# Patient Record
Sex: FEMALE | Race: WHITE | Employment: OTHER | URBAN - NONMETROPOLITAN AREA
[De-identification: names, ages, dates, MRNs, and addresses within clinical notes are randomized per-mention and may not be internally consistent; named-entity substitution may affect disease eponyms.]

---

## 2017-06-22 ENCOUNTER — OFFICE VISIT (OUTPATIENT)
Age: 62
End: 2017-06-22

## 2017-06-22 ENCOUNTER — TELEPHONE (OUTPATIENT)
Age: 62
End: 2017-06-22

## 2017-06-22 VITALS
SYSTOLIC BLOOD PRESSURE: 110 MMHG | RESPIRATION RATE: 18 BRPM | OXYGEN SATURATION: 96 % | BODY MASS INDEX: 25.26 KG/M2 | HEART RATE: 77 BPM | HEIGHT: 65 IN | WEIGHT: 151.6 LBS | DIASTOLIC BLOOD PRESSURE: 62 MMHG | TEMPERATURE: 96.6 F

## 2017-06-22 DIAGNOSIS — Z12.11 ENCOUNTER FOR SCREENING COLONOSCOPY: Primary | ICD-10-CM

## 2017-06-22 PROCEDURE — 99999 PR OFFICE/OUTPT VISIT,PROCEDURE ONLY: CPT | Performed by: SURGERY

## 2017-06-22 ASSESSMENT — ENCOUNTER SYMPTOMS
BACK PAIN: 0
TROUBLE SWALLOWING: 0
ABDOMINAL PAIN: 0
EYE PAIN: 0
CONSTIPATION: 0
EYE REDNESS: 0
VOMITING: 0
VOICE CHANGE: 0
NAUSEA: 0
FACIAL SWELLING: 0
RHINORRHEA: 0
COUGH: 0
ANAL BLEEDING: 0
APNEA: 0
ABDOMINAL DISTENTION: 0
SORE THROAT: 0
CHEST TIGHTNESS: 0
SHORTNESS OF BREATH: 0
PHOTOPHOBIA: 0
STRIDOR: 0
DIARRHEA: 0
EYE ITCHING: 0
RECTAL PAIN: 0
SINUS PRESSURE: 0
BLOOD IN STOOL: 0
CHOKING: 0
EYE DISCHARGE: 0
WHEEZING: 0
COLOR CHANGE: 0

## 2017-07-18 ENCOUNTER — ANESTHESIA EVENT (OUTPATIENT)
Dept: ENDOSCOPY | Age: 62
End: 2017-07-18
Payer: COMMERCIAL

## 2017-07-18 ENCOUNTER — ANESTHESIA (OUTPATIENT)
Dept: ENDOSCOPY | Age: 62
End: 2017-07-18
Payer: COMMERCIAL

## 2017-07-18 ENCOUNTER — HOSPITAL ENCOUNTER (OUTPATIENT)
Age: 62
Setting detail: OUTPATIENT SURGERY
Discharge: HOME | End: 2017-07-18
Attending: SURGERY | Admitting: SURGERY
Payer: COMMERCIAL

## 2017-07-18 VITALS
HEART RATE: 68 BPM | OXYGEN SATURATION: 99 % | HEIGHT: 64 IN | TEMPERATURE: 97.2 F | DIASTOLIC BLOOD PRESSURE: 67 MMHG | RESPIRATION RATE: 18 BRPM | BODY MASS INDEX: 26.12 KG/M2 | WEIGHT: 153 LBS | SYSTOLIC BLOOD PRESSURE: 104 MMHG

## 2017-07-18 VITALS — DIASTOLIC BLOOD PRESSURE: 72 MMHG | OXYGEN SATURATION: 98 % | SYSTOLIC BLOOD PRESSURE: 108 MMHG

## 2017-07-18 PROCEDURE — 7100000000 HC PACU RECOVERY - FIRST 15 MIN: Performed by: SURGERY

## 2017-07-18 PROCEDURE — 3609027000 HC COLONOSCOPY: Performed by: SURGERY

## 2017-07-18 PROCEDURE — 2500000003 HC RX 250 WO HCPCS

## 2017-07-18 PROCEDURE — 2580000003 HC RX 258: Performed by: SURGERY

## 2017-07-18 PROCEDURE — 3700000001 HC ADD 15 MINUTES (ANESTHESIA): Performed by: SURGERY

## 2017-07-18 PROCEDURE — 6360000002 HC RX W HCPCS: Performed by: NURSE ANESTHETIST, CERTIFIED REGISTERED

## 2017-07-18 PROCEDURE — 3700000000 HC ANESTHESIA ATTENDED CARE: Performed by: SURGERY

## 2017-07-18 PROCEDURE — 6360000002 HC RX W HCPCS

## 2017-07-18 PROCEDURE — 7100000001 HC PACU RECOVERY - ADDTL 15 MIN: Performed by: SURGERY

## 2017-07-18 RX ORDER — SODIUM CHLORIDE 450 MG/100ML
INJECTION, SOLUTION INTRAVENOUS CONTINUOUS
Status: DISCONTINUED | OUTPATIENT
Start: 2017-07-18 | End: 2017-07-18 | Stop reason: HOSPADM

## 2017-07-18 RX ORDER — PROPOFOL 10 MG/ML
INJECTION, EMULSION INTRAVENOUS PRN
Status: DISCONTINUED | OUTPATIENT
Start: 2017-07-18 | End: 2017-07-18 | Stop reason: SDUPTHER

## 2017-07-18 RX ADMIN — PROPOFOL 50 MG: 10 INJECTION, EMULSION INTRAVENOUS at 08:30

## 2017-07-18 RX ADMIN — PROPOFOL 50 MG: 10 INJECTION, EMULSION INTRAVENOUS at 08:25

## 2017-07-18 RX ADMIN — PROPOFOL 50 MG: 10 INJECTION, EMULSION INTRAVENOUS at 08:14

## 2017-07-18 RX ADMIN — PROPOFOL 50 MG: 10 INJECTION, EMULSION INTRAVENOUS at 08:15

## 2017-07-18 RX ADMIN — PROPOFOL 50 MG: 10 INJECTION, EMULSION INTRAVENOUS at 08:20

## 2017-07-18 RX ADMIN — PROPOFOL 50 MG: 10 INJECTION, EMULSION INTRAVENOUS at 08:35

## 2017-07-18 RX ADMIN — SODIUM CHLORIDE: 4.5 INJECTION, SOLUTION INTRAVENOUS at 07:41

## 2017-07-18 ASSESSMENT — PAIN - FUNCTIONAL ASSESSMENT: PAIN_FUNCTIONAL_ASSESSMENT: 0-10

## 2017-07-18 ASSESSMENT — PAIN SCALES - GENERAL
PAINLEVEL_OUTOF10: 0
PAINLEVEL_OUTOF10: 0

## 2017-07-20 ENCOUNTER — OFFICE VISIT (OUTPATIENT)
Dept: FAMILY MEDICINE CLINIC | Age: 62
End: 2017-07-20
Payer: COMMERCIAL

## 2017-07-20 VITALS
DIASTOLIC BLOOD PRESSURE: 70 MMHG | BODY MASS INDEX: 25.49 KG/M2 | HEART RATE: 64 BPM | SYSTOLIC BLOOD PRESSURE: 118 MMHG | WEIGHT: 153 LBS | HEIGHT: 65 IN

## 2017-07-20 DIAGNOSIS — E66.3 OVERWEIGHT: ICD-10-CM

## 2017-07-20 DIAGNOSIS — E03.8 OTHER SPECIFIED HYPOTHYROIDISM: ICD-10-CM

## 2017-07-20 DIAGNOSIS — L23.7 CONTACT DERMATITIS DUE TO POISON IVY: Primary | ICD-10-CM

## 2017-07-20 PROCEDURE — 99213 OFFICE O/P EST LOW 20 MIN: CPT | Performed by: FAMILY MEDICINE

## 2017-07-20 RX ORDER — PREDNISONE 20 MG/1
40 TABLET ORAL DAILY
Qty: 28 TABLET | Refills: 0 | Status: SHIPPED | OUTPATIENT
Start: 2017-07-20 | End: 2017-08-03

## 2017-07-20 RX ORDER — TRIAMCINOLONE ACETONIDE 1 MG/G
CREAM TOPICAL
Qty: 1 TUBE | Refills: 0 | Status: SHIPPED | OUTPATIENT
Start: 2017-07-20 | End: 2017-07-27 | Stop reason: ALTCHOICE

## 2017-07-21 ENCOUNTER — HOSPITAL ENCOUNTER (OUTPATIENT)
Age: 62
Setting detail: SPECIMEN
Discharge: HOME | End: 2017-07-21
Payer: COMMERCIAL

## 2017-07-21 DIAGNOSIS — E03.8 OTHER SPECIFIED HYPOTHYROIDISM: ICD-10-CM

## 2017-07-21 DIAGNOSIS — E66.3 OVERWEIGHT: ICD-10-CM

## 2017-07-21 LAB
GLUCOSE FASTING: 101 MG/DL (ref 70–108)
T4 FREE: 1.64 NG/DL (ref 0.93–1.76)
TSH SERPL DL<=0.05 MIU/L-ACNC: 0.29 UIU/ML (ref 0.4–4.2)

## 2017-07-21 PROCEDURE — 82947 ASSAY GLUCOSE BLOOD QUANT: CPT

## 2017-07-21 PROCEDURE — 84439 ASSAY OF FREE THYROXINE: CPT

## 2017-07-21 PROCEDURE — 84443 ASSAY THYROID STIM HORMONE: CPT

## 2017-07-21 PROCEDURE — 36415 COLL VENOUS BLD VENIPUNCTURE: CPT

## 2017-07-23 PROBLEM — R73.01 ELEVATED FASTING GLUCOSE: Status: ACTIVE | Noted: 2017-07-23

## 2017-07-24 ENCOUNTER — TELEPHONE (OUTPATIENT)
Dept: FAMILY MEDICINE CLINIC | Age: 62
End: 2017-07-24

## 2017-07-27 ENCOUNTER — OFFICE VISIT (OUTPATIENT)
Dept: FAMILY MEDICINE CLINIC | Age: 62
End: 2017-07-27
Payer: COMMERCIAL

## 2017-07-27 VITALS
BODY MASS INDEX: 25.52 KG/M2 | WEIGHT: 153.2 LBS | HEIGHT: 65 IN | SYSTOLIC BLOOD PRESSURE: 128 MMHG | HEART RATE: 60 BPM | DIASTOLIC BLOOD PRESSURE: 80 MMHG

## 2017-07-27 DIAGNOSIS — R73.01 ELEVATED FASTING GLUCOSE: ICD-10-CM

## 2017-07-27 DIAGNOSIS — E03.8 OTHER SPECIFIED HYPOTHYROIDISM: Primary | ICD-10-CM

## 2017-07-27 PROCEDURE — 99213 OFFICE O/P EST LOW 20 MIN: CPT | Performed by: FAMILY MEDICINE

## 2017-07-27 RX ORDER — LEVOTHYROXINE SODIUM 0.15 MG/1
150 TABLET ORAL DAILY
Qty: 90 TABLET | Refills: 3 | Status: SHIPPED | OUTPATIENT
Start: 2017-07-27 | End: 2018-01-24

## 2017-08-23 DIAGNOSIS — Z12.11 ENCOUNTER FOR SCREENING COLONOSCOPY: ICD-10-CM

## 2018-01-24 ENCOUNTER — OFFICE VISIT (OUTPATIENT)
Dept: FAMILY MEDICINE CLINIC | Age: 63
End: 2018-01-24
Payer: COMMERCIAL

## 2018-01-24 ENCOUNTER — TELEPHONE (OUTPATIENT)
Dept: FAMILY MEDICINE CLINIC | Age: 63
End: 2018-01-24

## 2018-01-24 VITALS
WEIGHT: 150.4 LBS | HEART RATE: 76 BPM | BODY MASS INDEX: 25.06 KG/M2 | HEIGHT: 65 IN | DIASTOLIC BLOOD PRESSURE: 70 MMHG | SYSTOLIC BLOOD PRESSURE: 104 MMHG

## 2018-01-24 DIAGNOSIS — E03.8 OTHER SPECIFIED HYPOTHYROIDISM: ICD-10-CM

## 2018-01-24 DIAGNOSIS — M25.531 RIGHT WRIST PAIN: Primary | ICD-10-CM

## 2018-01-24 LAB
T4 FREE: 2.01 NG/DL (ref 0.93–1.76)
TSH SERPL DL<=0.05 MIU/L-ACNC: 0.05 UIU/ML (ref 0.4–4.2)

## 2018-01-24 PROCEDURE — 36415 COLL VENOUS BLD VENIPUNCTURE: CPT | Performed by: FAMILY MEDICINE

## 2018-01-24 PROCEDURE — 99213 OFFICE O/P EST LOW 20 MIN: CPT | Performed by: FAMILY MEDICINE

## 2018-01-24 RX ORDER — NAPROXEN 500 MG/1
500 TABLET ORAL 2 TIMES DAILY WITH MEALS
Qty: 30 TABLET | Refills: 0 | Status: SHIPPED | OUTPATIENT
Start: 2018-01-24 | End: 2018-02-21 | Stop reason: SDUPTHER

## 2018-01-24 RX ORDER — LEVOTHYROXINE SODIUM 125 MCG
125 TABLET ORAL DAILY
Qty: 30 TABLET | Refills: 0 | Status: SHIPPED | OUTPATIENT
Start: 2018-01-24 | End: 2018-02-20

## 2018-01-24 ASSESSMENT — PATIENT HEALTH QUESTIONNAIRE - PHQ9
2. FEELING DOWN, DEPRESSED OR HOPELESS: 0
SUM OF ALL RESPONSES TO PHQ9 QUESTIONS 1 & 2: 0
SUM OF ALL RESPONSES TO PHQ9 QUESTIONS 1 & 2: 0
SUM OF ALL RESPONSES TO PHQ QUESTIONS 1-9: 0
1. LITTLE INTEREST OR PLEASURE IN DOING THINGS: 0
SUM OF ALL RESPONSES TO PHQ QUESTIONS 1-9: 0
2. FEELING DOWN, DEPRESSED OR HOPELESS: 0

## 2018-01-24 NOTE — PATIENT INSTRUCTIONS
Patient Education        Carpal Tunnel Syndrome: Exercises  Your Care Instructions  Here are some examples of typical rehabilitation exercises for your condition. Start each exercise slowly. Ease off the exercise if you start to have pain. Your doctor or your physical or occupational therapist will tell you when you can start these exercises and which ones will work best for you. Warm-up stretches  When you no longer have pain or numbness, you can do exercises to help prevent carpal tunnel syndrome from coming back. Do not do any stretch or movement that is uncomfortable or painful. 1. Rotate your wrist up, down, and from side to side. Repeat 4 times. 2. Stretch your fingers far apart. Relax them, and then stretch them again. Repeat 4 times. 3. Stretch your thumb by pulling it back gently, holding it, and then releasing it. Repeat 4 times. How to do the exercises  Prayer stretch    1. Start with your palms together in front of your chest just below your chin. 2. Slowly lower your hands toward your waistline, keeping your hands close to your stomach and your palms together until you feel a mild to moderate stretch under your forearms. 3. Hold for at least 15 to 30 seconds. Repeat 2 to 4 times. Wrist flexor stretch    1. Extend your arm in front of you with your palm up. 2. Bend your wrist, pointing your hand toward the floor. 3. With your other hand, gently bend your wrist farther until you feel a mild to moderate stretch in your forearm. 4. Hold for at least 15 to 30 seconds. Repeat 2 to 4 times. Wrist extensor stretch    1. Repeat steps 1 through 4 of the stretch above, but begin with your extended hand palm down. Follow-up care is a key part of your treatment and safety. Be sure to make and go to all appointments, and call your doctor if you are having problems. It's also a good idea to know your test results and keep a list of the medicines you take. Where can you learn more?   Go to

## 2018-01-24 NOTE — TELEPHONE ENCOUNTER
Low tsh with high t4. She is overtreated on synthroid. Decrease synthroid to 125 mg which was sent to pharmacy. Recheck tsh in 1 month. Please advise patient.   Mikaela Shaikh MD

## 2018-02-19 ENCOUNTER — NURSE ONLY (OUTPATIENT)
Dept: FAMILY MEDICINE CLINIC | Age: 63
End: 2018-02-19
Payer: COMMERCIAL

## 2018-02-19 DIAGNOSIS — E03.8 OTHER SPECIFIED HYPOTHYROIDISM: ICD-10-CM

## 2018-02-19 LAB
T4 FREE: 1.78 NG/DL (ref 0.93–1.76)
TSH SERPL DL<=0.05 MIU/L-ACNC: 0.12 UIU/ML (ref 0.4–4.2)

## 2018-02-19 PROCEDURE — 36415 COLL VENOUS BLD VENIPUNCTURE: CPT | Performed by: FAMILY MEDICINE

## 2018-02-20 ENCOUNTER — TELEPHONE (OUTPATIENT)
Dept: FAMILY MEDICINE CLINIC | Age: 63
End: 2018-02-20

## 2018-02-20 DIAGNOSIS — E03.8 OTHER SPECIFIED HYPOTHYROIDISM: ICD-10-CM

## 2018-02-20 RX ORDER — LEVOTHYROXINE SODIUM 100 MCG
100 TABLET ORAL DAILY
Qty: 30 TABLET | Refills: 0 | Status: SHIPPED | OUTPATIENT
Start: 2018-02-20 | End: 2018-03-21 | Stop reason: SDUPTHER

## 2018-02-21 ENCOUNTER — OFFICE VISIT (OUTPATIENT)
Dept: FAMILY MEDICINE CLINIC | Age: 63
End: 2018-02-21
Payer: COMMERCIAL

## 2018-02-21 VITALS
SYSTOLIC BLOOD PRESSURE: 118 MMHG | DIASTOLIC BLOOD PRESSURE: 70 MMHG | BODY MASS INDEX: 25.29 KG/M2 | HEIGHT: 65 IN | HEART RATE: 72 BPM | WEIGHT: 151.8 LBS

## 2018-02-21 DIAGNOSIS — M25.531 BILATERAL WRIST PAIN: Primary | ICD-10-CM

## 2018-02-21 DIAGNOSIS — M25.532 BILATERAL WRIST PAIN: Primary | ICD-10-CM

## 2018-02-21 DIAGNOSIS — E03.8 OTHER SPECIFIED HYPOTHYROIDISM: ICD-10-CM

## 2018-02-21 PROCEDURE — 99213 OFFICE O/P EST LOW 20 MIN: CPT | Performed by: FAMILY MEDICINE

## 2018-02-21 RX ORDER — NAPROXEN 500 MG/1
500 TABLET ORAL 2 TIMES DAILY WITH MEALS
Qty: 60 TABLET | Refills: 0 | Status: SHIPPED | OUTPATIENT
Start: 2018-02-21 | End: 2019-06-12

## 2018-02-21 NOTE — PATIENT INSTRUCTIONS
Patient Education        Carpal Tunnel Syndrome: Care Instructions  Your Care Instructions    Carpal tunnel syndrome is a nerve problem. It can cause tingling, numbness, weakness, or pain in the fingers, thumb, and hand. The median nerve and several tough tissues called tendons run through a space in the wrist called the carpal tunnel. The repeated hand motions used in work and some hobbies and sports can put pressure on the nerve. Pregnancy and several conditions, including diabetes, arthritis, and an underactive thyroid, also can cause carpal tunnel syndrome. You may be able to limit an activity or do it differently to reduce your symptoms. You also can take other steps to feel better. If your symptoms are mild, 1 to 2 weeks of home treatment are likely to ease your pain. Surgery is needed only if other treatments do not work. Follow-up care is a key part of your treatment and safety. Be sure to make and go to all appointments, and call your doctor if you are having problems. It's also a good idea to know your test results and keep a list of the medicines you take. How can you care for yourself at home? · If possible, stop or reduce the activity that causes your symptoms. If you cannot stop the activity, take frequent breaks to rest and stretch or change hand positions to do a task. Try switching hands, such as when using a computer mouse. · Try to avoid bending or twisting your wrists. · Ask your doctor if you can take an over-the-counter pain medicine, such as acetaminophen (Tylenol), ibuprofen (Advil, Motrin), or naproxen (Aleve). Be safe with medicines. Read and follow all instructions on the label. · If your doctor prescribes corticosteroid medicine to help reduce pain and swelling, take it exactly as prescribed. Call your doctor if you think you are having a problem with your medicine. · Put ice or a cold pack on your wrist for 10 to 20 minutes at a time to ease pain.  Put a thin cloth between the ice

## 2018-02-21 NOTE — PROGRESS NOTES
3,000 mg by mouth 3 times daily       No current facility-administered medications for this visit. No Known Allergies  Health Maintenance   Topic Date Due    Hepatitis C screen  1955    A1C test (Diabetic or Prediabetic)  08/14/1966    HIV screen  08/14/1971    DTaP/Tdap/Td vaccine (1 - Tdap) 08/14/1975    Shingles Vaccine (1 of 2 - 2 Dose Series) 08/14/2006    Flu vaccine (1) 09/01/2017    Cervical cancer screen  04/11/2018    TSH testing  02/19/2019    Breast cancer screen  05/22/2019    Lipid screen  10/10/2021    Colon cancer screen colonoscopy  07/18/2022       Objective:  /70 (Site: Left Arm, Position: Sitting, Cuff Size: Medium Adult)   Pulse 72   Ht 5' 4.5\" (1.638 m)   Wt 151 lb 12.8 oz (68.9 kg)   BMI 25.65 kg/m²   Physical Exam   Constitutional: She is oriented to person, place, and time. She appears well-developed and well-nourished. No distress. Neurological: She is alert and oriented to person, place, and time. Psychiatric: She has a normal mood and affect. Her behavior is normal.   Vitals reviewed. Right Wrist  Inspection:  No ecchymosis or swelling. Tenderness: No tenderness  No scaphoid tenderness. No elbow tenderness. AROM:  Flexion WNL   Extension WNL     Strength:  5/5 flexion     Crepitus:  no   Tinel's test of median nerve:  negative   Phalen:  positive with tingling in fingers 1-3      No tenderness of right elbow  RUE sensation and pulses intact. No thenar atrophy. Left wrist:  No swelling. No tenderness. Negative tinels. Negative phalen's. Impression/Plan:  1. Bilateral wrist pain  Likely due to mild carpal tunnel syndrome. Improved with naprosyn  -refill naproxen (NAPROSYN) 500 MG tablet; Take 1 tablet by mouth 2 times daily (with meals)  Dispense: 60 tablet; Refill: 0  -no push ups. Continue HEP. Will await until thyroid function is normalized and see if symptoms have resolved.       2. Other specified hypothyroidism  -synthroid

## 2018-03-13 DIAGNOSIS — E03.8 OTHER SPECIFIED HYPOTHYROIDISM: ICD-10-CM

## 2018-03-13 RX ORDER — LEVOTHYROXINE SODIUM 100 MCG
100 TABLET ORAL DAILY
Qty: 90 TABLET | Refills: 1 | OUTPATIENT
Start: 2018-03-13

## 2018-03-20 ENCOUNTER — TELEPHONE (OUTPATIENT)
Dept: FAMILY MEDICINE CLINIC | Age: 63
End: 2018-03-20

## 2018-03-20 ENCOUNTER — NURSE ONLY (OUTPATIENT)
Dept: FAMILY MEDICINE CLINIC | Age: 63
End: 2018-03-20
Payer: COMMERCIAL

## 2018-03-20 DIAGNOSIS — E03.8 OTHER SPECIFIED HYPOTHYROIDISM: ICD-10-CM

## 2018-03-20 LAB — TSH SERPL DL<=0.05 MIU/L-ACNC: 0.83 UIU/ML (ref 0.4–4.2)

## 2018-03-20 PROCEDURE — 36415 COLL VENOUS BLD VENIPUNCTURE: CPT | Performed by: FAMILY MEDICINE

## 2018-03-20 NOTE — TELEPHONE ENCOUNTER
Left message for patient to call back.
TSH is now in the normal range. we'll continue Synthroid 100 µg. Which pharmacy does she want medication refill sent to? Does she need JEANE? Please advise patient.   Daniel Murcia MD
normal...

## 2018-03-20 NOTE — PROGRESS NOTES
Venipuncture obtained from left Arm. Patient tolerated the procedure without complications or complaints.   left

## 2018-03-21 RX ORDER — LEVOTHYROXINE SODIUM 100 MCG
100 TABLET ORAL DAILY
Qty: 90 TABLET | Refills: 1 | Status: SHIPPED | OUTPATIENT
Start: 2018-03-21 | End: 2018-04-23 | Stop reason: SDUPTHER

## 2018-04-19 ENCOUNTER — TELEPHONE (OUTPATIENT)
Dept: FAMILY MEDICINE CLINIC | Age: 63
End: 2018-04-19

## 2018-04-23 ENCOUNTER — OFFICE VISIT (OUTPATIENT)
Dept: FAMILY MEDICINE CLINIC | Age: 63
End: 2018-04-23
Payer: COMMERCIAL

## 2018-04-23 ENCOUNTER — HOSPITAL ENCOUNTER (OUTPATIENT)
Dept: MAMMOGRAPHY | Age: 63
Discharge: HOME | End: 2018-04-23
Payer: COMMERCIAL

## 2018-04-23 VITALS
WEIGHT: 152.6 LBS | HEART RATE: 76 BPM | HEIGHT: 65 IN | BODY MASS INDEX: 25.43 KG/M2 | SYSTOLIC BLOOD PRESSURE: 112 MMHG | DIASTOLIC BLOOD PRESSURE: 74 MMHG

## 2018-04-23 DIAGNOSIS — M25.531 BILATERAL WRIST PAIN: Primary | ICD-10-CM

## 2018-04-23 DIAGNOSIS — E03.8 OTHER SPECIFIED HYPOTHYROIDISM: ICD-10-CM

## 2018-04-23 DIAGNOSIS — M25.532 BILATERAL WRIST PAIN: Primary | ICD-10-CM

## 2018-04-23 DIAGNOSIS — Z12.39 SCREENING FOR BREAST CANCER: ICD-10-CM

## 2018-04-23 PROCEDURE — 77067 SCR MAMMO BI INCL CAD: CPT

## 2018-04-23 PROCEDURE — 99213 OFFICE O/P EST LOW 20 MIN: CPT | Performed by: FAMILY MEDICINE

## 2018-04-23 RX ORDER — LEVOTHYROXINE SODIUM 100 MCG
100 TABLET ORAL DAILY
Qty: 90 TABLET | Refills: 1 | Status: SHIPPED | OUTPATIENT
Start: 2018-04-23 | End: 2018-10-22 | Stop reason: SDUPTHER

## 2018-05-03 ENCOUNTER — PROCEDURE VISIT (OUTPATIENT)
Dept: NEUROLOGY | Age: 63
End: 2018-05-03
Payer: COMMERCIAL

## 2018-05-03 ENCOUNTER — TELEPHONE (OUTPATIENT)
Dept: FAMILY MEDICINE CLINIC | Age: 63
End: 2018-05-03

## 2018-05-03 DIAGNOSIS — G56.12 LEFT MEDIAN NERVE NEUROPATHY: Primary | ICD-10-CM

## 2018-05-03 DIAGNOSIS — M25.532 BILATERAL WRIST PAIN: ICD-10-CM

## 2018-05-03 DIAGNOSIS — R20.0 BILATERAL HAND NUMBNESS: ICD-10-CM

## 2018-05-03 DIAGNOSIS — G56.11 RIGHT MEDIAN NERVE NEUROPATHY: ICD-10-CM

## 2018-05-03 DIAGNOSIS — M25.531 BILATERAL WRIST PAIN: ICD-10-CM

## 2018-05-03 DIAGNOSIS — G56.03 BILATERAL CARPAL TUNNEL SYNDROME: Primary | ICD-10-CM

## 2018-05-03 DIAGNOSIS — G56.03 CARPAL TUNNEL SYNDROME, BILATERAL: ICD-10-CM

## 2018-05-03 PROCEDURE — 95886 MUSC TEST DONE W/N TEST COMP: CPT | Performed by: PSYCHIATRY & NEUROLOGY

## 2018-05-03 PROCEDURE — 95911 NRV CNDJ TEST 9-10 STUDIES: CPT | Performed by: PSYCHIATRY & NEUROLOGY

## 2018-10-22 DIAGNOSIS — E03.8 OTHER SPECIFIED HYPOTHYROIDISM: ICD-10-CM

## 2018-10-22 RX ORDER — LEVOTHYROXINE SODIUM 100 MCG
100 TABLET ORAL DAILY
Qty: 90 TABLET | Refills: 1 | Status: SHIPPED | OUTPATIENT
Start: 2018-10-22 | End: 2019-06-12 | Stop reason: SDUPTHER

## 2019-01-21 ENCOUNTER — HOSPITAL ENCOUNTER (EMERGENCY)
Age: 64
Discharge: HOME | End: 2019-01-21
Attending: EMERGENCY MEDICINE
Payer: COMMERCIAL

## 2019-01-21 VITALS
TEMPERATURE: 97.4 F | HEART RATE: 92 BPM | SYSTOLIC BLOOD PRESSURE: 127 MMHG | OXYGEN SATURATION: 99 % | DIASTOLIC BLOOD PRESSURE: 72 MMHG | RESPIRATION RATE: 18 BRPM

## 2019-01-21 DIAGNOSIS — N30.00 ACUTE CYSTITIS WITHOUT HEMATURIA: Primary | ICD-10-CM

## 2019-01-21 LAB
AMORPHOUS: ABNORMAL
BACTERIA: ABNORMAL
BILIRUBIN URINE: NEGATIVE
BLOOD, URINE: ABNORMAL
CASTS UA: ABNORMAL /LPF
CHARACTER, URINE: ABNORMAL
COLOR: ABNORMAL
CRYSTALS, UA: ABNORMAL
EPITHELIAL CELLS, UA: ABNORMAL /HPF
GLUCOSE, URINE: NEGATIVE MG/DL
KETONES, URINE: NEGATIVE
LEUKOCYTE ESTERASE, URINE: ABNORMAL
MUCUS: ABNORMAL
NITRITE, URINE: NEGATIVE
PH UA: 6.5 (ref 5–9)
PROTEIN UA: 30 MG/DL
RBC UA: ABNORMAL /HPF
REFLEX TO URINE C & S: ABNORMAL
SPECIFIC GRAVITY UA: 1.01 (ref 1–1.03)
UROBILINOGEN, URINE: 0.2 EU/DL (ref 0–1)
WBC UA: ABNORMAL /HPF

## 2019-01-21 PROCEDURE — 87184 SC STD DISK METHOD PER PLATE: CPT

## 2019-01-21 PROCEDURE — 87086 URINE CULTURE/COLONY COUNT: CPT

## 2019-01-21 PROCEDURE — 87077 CULTURE AEROBIC IDENTIFY: CPT

## 2019-01-21 PROCEDURE — 6370000000 HC RX 637 (ALT 250 FOR IP): Performed by: EMERGENCY MEDICINE

## 2019-01-21 PROCEDURE — 81001 URINALYSIS AUTO W/SCOPE: CPT

## 2019-01-21 PROCEDURE — 87186 SC STD MICRODIL/AGAR DIL: CPT

## 2019-01-21 PROCEDURE — 99283 EMERGENCY DEPT VISIT LOW MDM: CPT

## 2019-01-21 RX ORDER — SULFAMETHOXAZOLE AND TRIMETHOPRIM 800; 160 MG/1; MG/1
1 TABLET ORAL ONCE
Status: COMPLETED | OUTPATIENT
Start: 2019-01-21 | End: 2019-01-21

## 2019-01-21 RX ORDER — SULFAMETHOXAZOLE AND TRIMETHOPRIM 800; 160 MG/1; MG/1
1 TABLET ORAL 2 TIMES DAILY
Qty: 14 TABLET | Refills: 0 | Status: SHIPPED | OUTPATIENT
Start: 2019-01-21 | End: 2019-01-24 | Stop reason: ALTCHOICE

## 2019-01-21 RX ORDER — PHENAZOPYRIDINE HYDROCHLORIDE 100 MG/1
200 TABLET, FILM COATED ORAL ONCE
Status: COMPLETED | OUTPATIENT
Start: 2019-01-21 | End: 2019-01-21

## 2019-01-21 RX ORDER — PHENAZOPYRIDINE HYDROCHLORIDE 100 MG/1
100 TABLET, FILM COATED ORAL 3 TIMES DAILY PRN
Qty: 6 TABLET | Refills: 0 | Status: SHIPPED | OUTPATIENT
Start: 2019-01-21 | End: 2019-01-23

## 2019-01-21 RX ADMIN — SULFAMETHOXAZOLE AND TRIMETHOPRIM 1 TABLET: 800; 160 TABLET ORAL at 22:36

## 2019-01-21 RX ADMIN — PHENAZOPYRIDINE HYDROCHLORIDE 200 MG: 100 TABLET ORAL at 22:35

## 2019-01-24 ENCOUNTER — TELEPHONE (OUTPATIENT)
Dept: FAMILY MEDICINE CLINIC | Age: 64
End: 2019-01-24

## 2019-01-24 DIAGNOSIS — B96.20 E. COLI UTI: Primary | ICD-10-CM

## 2019-01-24 DIAGNOSIS — N39.0 E. COLI UTI: Primary | ICD-10-CM

## 2019-01-24 LAB
ORGANISM: ABNORMAL
URINE CULTURE REFLEX: ABNORMAL

## 2019-01-24 RX ORDER — CIPROFLOXACIN 500 MG/1
500 TABLET, FILM COATED ORAL 2 TIMES DAILY
Qty: 10 TABLET | Refills: 0 | Status: SHIPPED | OUTPATIENT
Start: 2019-01-24 | End: 2019-01-29

## 2019-01-31 ENCOUNTER — OFFICE VISIT (OUTPATIENT)
Dept: FAMILY MEDICINE CLINIC | Age: 64
End: 2019-01-31
Payer: COMMERCIAL

## 2019-01-31 VITALS
SYSTOLIC BLOOD PRESSURE: 128 MMHG | HEART RATE: 68 BPM | BODY MASS INDEX: 26.12 KG/M2 | WEIGHT: 153 LBS | HEIGHT: 64 IN | DIASTOLIC BLOOD PRESSURE: 66 MMHG

## 2019-01-31 DIAGNOSIS — J20.8 ACUTE BRONCHITIS DUE TO OTHER SPECIFIED ORGANISMS: Primary | ICD-10-CM

## 2019-01-31 PROCEDURE — 99213 OFFICE O/P EST LOW 20 MIN: CPT | Performed by: FAMILY MEDICINE

## 2019-01-31 RX ORDER — AZITHROMYCIN 250 MG/1
250 TABLET, FILM COATED ORAL SEE ADMIN INSTRUCTIONS
Qty: 6 TABLET | Refills: 0 | Status: SHIPPED | OUTPATIENT
Start: 2019-01-31 | End: 2019-02-05

## 2019-01-31 RX ORDER — PREDNISONE 20 MG/1
20 TABLET ORAL 2 TIMES DAILY
Qty: 10 TABLET | Refills: 0 | Status: SHIPPED | OUTPATIENT
Start: 2019-01-31 | End: 2019-02-05

## 2019-01-31 RX ORDER — BENZONATATE 100 MG/1
100 CAPSULE ORAL 3 TIMES DAILY PRN
Qty: 30 CAPSULE | Refills: 0 | Status: SHIPPED | OUTPATIENT
Start: 2019-01-31 | End: 2019-02-07

## 2019-01-31 ASSESSMENT — ENCOUNTER SYMPTOMS
WHEEZING: 0
COUGH: 1
SHORTNESS OF BREATH: 0

## 2019-01-31 ASSESSMENT — PATIENT HEALTH QUESTIONNAIRE - PHQ9
SUM OF ALL RESPONSES TO PHQ9 QUESTIONS 1 & 2: 0
SUM OF ALL RESPONSES TO PHQ QUESTIONS 1-9: 0
1. LITTLE INTEREST OR PLEASURE IN DOING THINGS: 0
2. FEELING DOWN, DEPRESSED OR HOPELESS: 0
SUM OF ALL RESPONSES TO PHQ QUESTIONS 1-9: 0

## 2019-04-22 ENCOUNTER — TELEPHONE (OUTPATIENT)
Dept: FAMILY MEDICINE CLINIC | Age: 64
End: 2019-04-22

## 2019-04-22 DIAGNOSIS — Z13.1 DIABETES MELLITUS SCREENING: ICD-10-CM

## 2019-04-22 DIAGNOSIS — E03.8 OTHER SPECIFIED HYPOTHYROIDISM: Primary | ICD-10-CM

## 2019-04-22 RX ORDER — LEVOTHYROXINE SODIUM 100 MCG
100 TABLET ORAL DAILY
Qty: 90 TABLET | Refills: 1 | Status: CANCELLED | OUTPATIENT
Start: 2019-04-22

## 2019-04-26 ENCOUNTER — HOSPITAL ENCOUNTER (OUTPATIENT)
Dept: MAMMOGRAPHY | Age: 64
Discharge: HOME | End: 2019-04-26
Payer: COMMERCIAL

## 2019-04-26 DIAGNOSIS — Z12.39 BREAST SCREENING: ICD-10-CM

## 2019-04-26 PROCEDURE — 77063 BREAST TOMOSYNTHESIS BI: CPT

## 2019-06-11 ENCOUNTER — NURSE ONLY (OUTPATIENT)
Dept: FAMILY MEDICINE CLINIC | Age: 64
End: 2019-06-11
Payer: COMMERCIAL

## 2019-06-11 DIAGNOSIS — Z13.1 DIABETES MELLITUS SCREENING: ICD-10-CM

## 2019-06-11 DIAGNOSIS — E03.8 OTHER SPECIFIED HYPOTHYROIDISM: ICD-10-CM

## 2019-06-11 LAB
ANION GAP SERPL CALCULATED.3IONS-SCNC: 13 MEQ/L (ref 8–16)
BUN BLDV-MCNC: 23 MG/DL (ref 7–22)
CALCIUM SERPL-MCNC: 9.7 MG/DL (ref 8.5–10.5)
CHLORIDE BLD-SCNC: 103 MEQ/L (ref 98–111)
CO2: 25 MEQ/L (ref 23–33)
CREAT SERPL-MCNC: 0.7 MG/DL (ref 0.4–1.2)
GFR SERPL CREATININE-BSD FRML MDRD: 85 ML/MIN/1.73M2
GLUCOSE BLD-MCNC: 106 MG/DL (ref 70–108)
POTASSIUM SERPL-SCNC: 4.8 MEQ/L (ref 3.5–5.2)
SODIUM BLD-SCNC: 141 MEQ/L (ref 135–145)
TSH SERPL DL<=0.05 MIU/L-ACNC: 2.78 UIU/ML (ref 0.4–4.2)

## 2019-06-11 PROCEDURE — 36415 COLL VENOUS BLD VENIPUNCTURE: CPT | Performed by: FAMILY MEDICINE

## 2019-06-12 ENCOUNTER — OFFICE VISIT (OUTPATIENT)
Dept: FAMILY MEDICINE CLINIC | Age: 64
End: 2019-06-12
Payer: COMMERCIAL

## 2019-06-12 VITALS
SYSTOLIC BLOOD PRESSURE: 104 MMHG | BODY MASS INDEX: 26.02 KG/M2 | WEIGHT: 152.4 LBS | HEIGHT: 64 IN | DIASTOLIC BLOOD PRESSURE: 70 MMHG | HEART RATE: 72 BPM

## 2019-06-12 DIAGNOSIS — E03.8 OTHER SPECIFIED HYPOTHYROIDISM: Primary | ICD-10-CM

## 2019-06-12 DIAGNOSIS — R73.01 ELEVATED FASTING GLUCOSE: ICD-10-CM

## 2019-06-12 PROCEDURE — 99213 OFFICE O/P EST LOW 20 MIN: CPT | Performed by: FAMILY MEDICINE

## 2019-06-12 RX ORDER — LEVOTHYROXINE SODIUM 100 MCG
100 TABLET ORAL DAILY
Qty: 90 TABLET | Refills: 3 | Status: SHIPPED | OUTPATIENT
Start: 2019-06-12 | End: 2019-07-22 | Stop reason: SDUPTHER

## 2019-06-12 ASSESSMENT — ENCOUNTER SYMPTOMS
WHEEZING: 0
SHORTNESS OF BREATH: 0

## 2019-06-12 NOTE — PATIENT INSTRUCTIONS
You may receive a survey regarding the care you received during your visit. Your input is valuable to us. We encourage you to complete and return your survey. We hope you will choose us in the future for your healthcare needs. Patient Education        Hypothyroidism: Care Instructions  Your Care Instructions    You have hypothyroidism, which means that your body is not making enough thyroid hormone. This hormone helps your body use energy. If your thyroid level is low, you may feel tired, be constipated, have an increase in your blood pressure, or have dry skin or memory problems. You may also get cold easily, even when it is warm. Women with low thyroid levels may have heavy menstrual periods. A blood test to find your thyroid-stimulating hormone (TSH) level is used to check for hypothyroidism. A high TSH level may mean that you have low thyroid. When your body is not making enough thyroid hormone, TSH levels rise in an effort to make the body produce more. The treatment for hypothyroidism is to take thyroid hormone pills. You should start to feel better in 1 to 2 weeks. But it can take several months to see changes in the TSH level. You will need regular visits with your doctor to make sure you have the right dose of medicine. Most people need treatment for the rest of their lives. You will need to see your doctor regularly to have blood tests and to make sure you are doing well. Follow-up care is a key part of your treatment and safety. Be sure to make and go to all appointments, and call your doctor if you are having problems. It's also a good idea to know your test results and keep a list of the medicines you take. How can you care for yourself at home? · Take your thyroid hormone medicine exactly as prescribed. Call your doctor if you think you are having a problem with your medicine. Most people do not have side effects if they take the right amount of medicine regularly.   ? Take the medicine 30 minutes before breakfast, and do not take it with calcium, vitamins, or iron. ? Do not take extra doses of your thyroid medicine. It will not help you get better any faster, and it may cause side effects. ? If you forget to take a dose, do NOT take a double dose of medicine. Take your usual dose the next day. · Tell your doctor about all prescription, herbal, or over-the-counter products you take. · Take care of yourself. Eat a healthy diet, get enough sleep, and get regular exercise. When should you call for help? Call 911 anytime you think you may need emergency care. For example, call if:    · You passed out (lost consciousness).     · You have severe trouble breathing.     · You have a very slow heartbeat (less than 60 beats a minute).     · You have a low body temperature (95°F or below).    Call your doctor now or seek immediate medical care if:    · You feel tired, sluggish, or weak.     · You have trouble remembering things or concentrating.     · You do not begin to feel better 2 weeks after starting your medicine.    Watch closely for changes in your health, and be sure to contact your doctor if you have any problems. Where can you learn more? Go to https://[employer]ivel[employer]. org and sign in to your [employer] account. Enter V113 in the [employer] box to learn more about \"Hypothyroidism: Care Instructions. \"     If you do not have an account, please click on the \"Sign Up Now\" link. Current as of: November 6, 2018  Content Version: 12.0  © 0333-7061 [employer], [employer]orporated. Care instructions adapted under license by [facility] ([facility]). If you have questions about a medical condition or this instruction, always ask your healthcare professional. [address] any warranty or liability for your use of this information.

## 2019-06-12 NOTE — PROGRESS NOTES
[facility]  [address] [address]  Dept: [phone]  Dept Fax: [phone]  Loc: [phone]  PROGRESS NOTE      Visit Date: 6/12/2019    Romaine Hairston is a 59 y.o. female who presents today for:  Chief Complaint   Patient presents with    6 Month Follow-Up    Hypothyroidism       Subjective:  HPI     1 year f/u  Hypothyroidism: Patient is taking Synthroid 100 mcg/day brand name as she felt bad when she took the generic in the past.  TSH was normal yesterday. No symptoms. Review of Systems   Constitutional: Negative for chills and fever. Respiratory: Negative for shortness of breath and wheezing. Cardiovascular: Negative for chest pain. Patient Active Problem List   Diagnosis    Chest pain, atypical    Hypothyroidism    Family history of early CAD her sister had stent at age 48yrs   Leighann Micheal Elevated fasting glucose     Past Medical History:   Diagnosis Date    Thyroid disease       Past Surgical History:   Procedure Laterality Date    CHOLECYSTECTOMY  2005? Dr. Froilan Floyd COLONOSCOPY  07/18/2017    Dr. Christiano Nicholas [facility]    ENDOSCOPY COLONOSCOPY SCREENING performed by Rohit Giron MD at [facility]     Family History   Problem Relation Age of Onset    Heart Disease Father     Diabetes Father     Heart Disease Sister     Cancer Sister         colon    Heart Disease Maternal Grandfather     Stroke Paternal Grandfather      Social History     Tobacco Use    Smoking status: Never Smoker    Smokeless tobacco: Never Used   Substance Use Topics    Alcohol use:  Yes     Alcohol/week: 1.2 oz     Types: 2 Cans of beer per week     Comment: rare      Current Outpatient Medications   Medication Sig Dispense Refill    SYNTHROID 100 MCG tablet Take 1 tablet by mouth Daily 90 tablet 1    Ascorbic Acid (VITAMIN C) 500 MG tablet Take 500 mg by mouth daily      Cyanocobalamin (VITAMIN B 12 PO) Take by mouth      Omega-3 Fatty Acids (FISH OIL) 1000 MG CAPS Take 3,000 mg by mouth 3 times daily       No current facility-administered medications for this visit. No Known Allergies  Health Maintenance   Topic Date Due    Hepatitis C screen  1955    A1C test (Diabetic or Prediabetic)  08/14/1966    HIV screen  08/14/1971    DTaP/Tdap/Td vaccine (1 - Tdap) 08/14/1975    Shingles Vaccine (2 of 3) 12/25/2013    Cervical cancer screen  04/11/2018    Flu vaccine (Season Ended) 09/01/2019    Breast cancer screen  04/23/2020    TSH testing  06/11/2020    Lipid screen  10/10/2021    Colon cancer screen colonoscopy  07/18/2022    Pneumococcal 0-64 years Vaccine  Aged Out       Objective:  /70 (Site: Right Upper Arm, Position: Sitting, Cuff Size: Medium Adult)   Pulse 72   Ht 5' 4\" (1.626 m)   Wt 152 lb 6.4 oz (69.1 kg)   BMI 26.16 kg/m²   Physical Exam   Constitutional: She is oriented to person, place, and time. She appears well-developed and well-nourished. No distress. Neck: Neck supple. No thyromegaly present. Cardiovascular: Normal rate and regular rhythm. No murmur heard. Pulmonary/Chest: Effort normal and breath sounds normal. No respiratory distress. Neurological: She is alert and oriented to person, place, and time. Psychiatric: She has a normal mood and affect. Her behavior is normal.   Vitals reviewed. Lab Results   Component Value Date    WBC 7.2 07/27/2012    HGB 14.1 07/27/2012    HCT 40.4 07/27/2012     07/27/2012    CHOL 195 10/10/2016    TRIG 66 10/10/2016    HDL 56 10/10/2016    LDLDIRECT 129.00 08/17/2013    ALT 15 07/28/2012    AST 18 07/26/2014     06/11/2019    K 4.8 06/11/2019     06/11/2019    CREATININE 0.7 06/11/2019    BUN 23 (H) 06/11/2019    CO2 25 06/11/2019    TSH 2.780 06/11/2019    GLUF 101 07/21/2017       Impression/Plan:  1.  Other specified Total Score Depression Severity: 1-4 = Minimal depression, 5-9 = Mild depression, 10-14 = Moderate depression, 15-19 = Moderately severe depression, 20-27 = Severe depression      Electronically signed by Maryanna Severin, MD on 6/12/2019 at 9:59 AM

## 2019-07-01 ENCOUNTER — OFFICE VISIT (OUTPATIENT)
Dept: FAMILY MEDICINE CLINIC | Age: 64
End: 2019-07-01
Payer: COMMERCIAL

## 2019-07-01 VITALS
DIASTOLIC BLOOD PRESSURE: 78 MMHG | BODY MASS INDEX: 26.29 KG/M2 | SYSTOLIC BLOOD PRESSURE: 130 MMHG | WEIGHT: 154 LBS | HEART RATE: 68 BPM | HEIGHT: 64 IN

## 2019-07-01 DIAGNOSIS — K59.09 OTHER CONSTIPATION: Primary | ICD-10-CM

## 2019-07-01 PROCEDURE — 99213 OFFICE O/P EST LOW 20 MIN: CPT | Performed by: FAMILY MEDICINE

## 2019-07-01 ASSESSMENT — ENCOUNTER SYMPTOMS
BLOOD IN STOOL: 0
DIARRHEA: 0
CONSTIPATION: 1
NAUSEA: 0
ABDOMINAL PAIN: 0

## 2019-07-01 NOTE — PROGRESS NOTES
behavior is normal.   Vitals reviewed. Impression/Plan:  1. Other constipation  New problem. abd is non-acute. Changing bowel regimen after an episode of diarrhea several weeks ago. Recommend conservative treatment. Try fiber supplement and miralax. I do not think an enema is needed at this time. They voiced understanding. All questions answered. They agreed with treatment plan. See patient instructions for any educational materials that may have been given. Discussed use, benefit, and side effects of prescribed medications. (Please note that portions of this note may have been completed with a voice recognition program.  Efforts were made to edit the dictation but occasionally words are mis-transcribed.)    Return if symptoms worsen or fail to improve.        Electronically signed by Blanca Shine MD on 7/1/2019 at 3:50 PM

## 2019-07-08 ENCOUNTER — TELEPHONE (OUTPATIENT)
Dept: FAMILY MEDICINE CLINIC | Age: 64
End: 2019-07-08

## 2019-07-08 DIAGNOSIS — K59.09 OTHER CONSTIPATION: Primary | ICD-10-CM

## 2019-07-10 ENCOUNTER — HOSPITAL ENCOUNTER (OUTPATIENT)
Age: 64
Discharge: HOME | End: 2019-07-10
Payer: COMMERCIAL

## 2019-07-10 ENCOUNTER — HOSPITAL ENCOUNTER (OUTPATIENT)
Dept: GENERAL RADIOLOGY | Age: 64
Discharge: HOME | End: 2019-07-10
Payer: COMMERCIAL

## 2019-07-10 ENCOUNTER — TELEPHONE (OUTPATIENT)
Dept: FAMILY MEDICINE CLINIC | Age: 64
End: 2019-07-10

## 2019-07-10 DIAGNOSIS — K59.09 OTHER CONSTIPATION: ICD-10-CM

## 2019-07-10 PROCEDURE — 74018 RADEX ABDOMEN 1 VIEW: CPT

## 2019-07-10 NOTE — TELEPHONE ENCOUNTER
----- Message from Mike Glaser MD sent at 7/10/2019  9:38 AM EDT -----  constipation. Recommend MiraLAX twice a day. Please advise patient.   Mike Glaser MD

## 2019-07-22 ENCOUNTER — TELEPHONE (OUTPATIENT)
Dept: FAMILY MEDICINE CLINIC | Age: 64
End: 2019-07-22

## 2019-07-22 DIAGNOSIS — E03.8 OTHER SPECIFIED HYPOTHYROIDISM: ICD-10-CM

## 2019-07-22 RX ORDER — LEVOTHYROXINE SODIUM 100 MCG
100 TABLET ORAL DAILY
Qty: 90 TABLET | Refills: 3 | Status: SHIPPED | OUTPATIENT
Start: 2019-07-22 | End: 2020-06-12 | Stop reason: SDUPTHER

## 2019-07-22 NOTE — TELEPHONE ENCOUNTER
Robert Price called requesting a refill on the following medications:  Requested Prescriptions     Pending Prescriptions Disp Refills    SYNTHROID 100 MCG tablet 90 tablet 3     Sig: Take 1 tablet by mouth Daily     *Patient thought it would be cheaper for her to get locally so she asked for printed Rx 06/21/19. Patient priced around and found it would be cheaper to use mail order. Please send new Rx to [facility].     Date of last visit: 7/1/2019  Date of next visit (if applicable):Visit date not found  Pharmacy Name: [facility],  [place], [facility] ([address] [place] [address])

## 2019-07-22 NOTE — TELEPHONE ENCOUNTER
Shanna Quigley phoned- states that she is still having trouble with constipation even after taking Miralax BID. Says that she is having 'little' bowel movements but feels it is still blocked. Wants to know what she should do next?

## 2019-07-29 ENCOUNTER — OFFICE VISIT (OUTPATIENT)
Dept: FAMILY MEDICINE CLINIC | Age: 64
End: 2019-07-29
Payer: COMMERCIAL

## 2019-07-29 VITALS
WEIGHT: 154.6 LBS | HEART RATE: 64 BPM | SYSTOLIC BLOOD PRESSURE: 118 MMHG | BODY MASS INDEX: 26.4 KG/M2 | HEIGHT: 64 IN | DIASTOLIC BLOOD PRESSURE: 62 MMHG

## 2019-07-29 DIAGNOSIS — K59.09 OTHER CONSTIPATION: Primary | ICD-10-CM

## 2019-07-29 PROCEDURE — 99213 OFFICE O/P EST LOW 20 MIN: CPT | Performed by: FAMILY MEDICINE

## 2019-07-29 ASSESSMENT — ENCOUNTER SYMPTOMS
VOMITING: 0
NAUSEA: 0
CONSTIPATION: 1
ABDOMINAL PAIN: 0

## 2019-07-29 NOTE — PROGRESS NOTES
Dispense Refill    SYNTHROID 100 MCG tablet Take 1 tablet by mouth Daily 90 tablet 3    Ascorbic Acid (VITAMIN C) 500 MG tablet Take 500 mg by mouth daily      Cyanocobalamin (VITAMIN B 12 PO) Take by mouth      Omega-3 Fatty Acids (FISH OIL) 1000 MG CAPS Take 3,000 mg by mouth 3 times daily       No current facility-administered medications for this visit. No Known Allergies  Health Maintenance   Topic Date Due    Hepatitis C screen  1955    A1C test (Diabetic or Prediabetic)  08/14/1966    HIV screen  08/14/1971    DTaP/Tdap/Td vaccine (1 - Tdap) 08/14/1975    Shingles Vaccine (2 of 3) 12/25/2013    Cervical cancer screen  04/11/2018    Flu vaccine (1) 09/01/2019    TSH testing  06/11/2020    Breast cancer screen  04/26/2021    Lipid screen  10/10/2021    Colon cancer screen colonoscopy  07/18/2022    Pneumococcal 0-64 years Vaccine  Aged Out       Objective:  /62 (Site: Left Upper Arm, Position: Sitting, Cuff Size: Medium Adult)   Pulse 64   Ht 5' 4\" (1.626 m)   Wt 154 lb 9.6 oz (70.1 kg)   BMI 26.54 kg/m²   Physical Exam   Constitutional: She is oriented to person, place, and time. She appears well-developed and well-nourished. No distress. Pulmonary/Chest: Effort normal. No respiratory distress. Abdominal: Soft. She exhibits no distension and no mass. There is no tenderness. There is no guarding. Neurological: She is alert and oriented to person, place, and time. Psychiatric: She has a normal mood and affect. Her behavior is normal.   Vitals reviewed. Impression/Plan:  1. Other constipation  Acute Problem. abd is non-acute. Improved after magnesium citrate. Continue MiraLAX. Discussed adding a probiotic or yogurt with active cultures. If this fails to improve, consider repeat abdominal x-ray and/or referral to GI. They voiced understanding. All questions answered. They agreed with treatment plan.    See patient instructions for any educational materials that may have been given. Discussed use, benefit, and side effects of prescribed medications. Reviewed health maintenance. (Please note that portions of this note may have been completed with a voice recognition program.  Efforts were made to edit the dictation but occasionally words are mis-transcribed.)    Return if symptoms worsen or fail to improve.       Electronically signed by Blanca Shine MD on 7/29/2019 at 10:39 AM

## 2019-08-13 ENCOUNTER — HOSPITAL ENCOUNTER (OUTPATIENT)
Dept: WOMENS IMAGING | Age: 64
Discharge: HOME | End: 2019-08-13
Payer: COMMERCIAL

## 2019-08-13 DIAGNOSIS — Z78.0 POST-MENOPAUSAL: ICD-10-CM

## 2019-08-13 PROCEDURE — 77080 DXA BONE DENSITY AXIAL: CPT

## 2019-10-01 ENCOUNTER — HOSPITAL ENCOUNTER (OUTPATIENT)
Age: 64
Discharge: HOME | End: 2019-10-01
Payer: COMMERCIAL

## 2019-10-01 DIAGNOSIS — R73.01 ELEVATED FASTING GLUCOSE: ICD-10-CM

## 2019-10-01 LAB
AVERAGE GLUCOSE: 102 MG/DL (ref 70–126)
HBA1C MFR BLD: 5.4 % (ref 4.4–6.4)

## 2019-10-01 PROCEDURE — 83036 HEMOGLOBIN GLYCOSYLATED A1C: CPT

## 2019-10-01 PROCEDURE — 36415 COLL VENOUS BLD VENIPUNCTURE: CPT

## 2019-12-27 ENCOUNTER — OFFICE VISIT (OUTPATIENT)
Dept: FAMILY MEDICINE CLINIC | Age: 64
End: 2019-12-27
Payer: COMMERCIAL

## 2019-12-27 VITALS
TEMPERATURE: 97.9 F | BODY MASS INDEX: 26.32 KG/M2 | HEART RATE: 56 BPM | DIASTOLIC BLOOD PRESSURE: 78 MMHG | HEIGHT: 64 IN | WEIGHT: 154.2 LBS | SYSTOLIC BLOOD PRESSURE: 122 MMHG

## 2019-12-27 DIAGNOSIS — J20.9 ACUTE BRONCHITIS, UNSPECIFIED ORGANISM: Primary | ICD-10-CM

## 2019-12-27 PROCEDURE — 99213 OFFICE O/P EST LOW 20 MIN: CPT | Performed by: FAMILY MEDICINE

## 2019-12-27 RX ORDER — AZITHROMYCIN 250 MG/1
TABLET, FILM COATED ORAL
Qty: 6 TABLET | Refills: 0 | Status: SHIPPED | OUTPATIENT
Start: 2019-12-27 | End: 2020-01-09 | Stop reason: ALTCHOICE

## 2019-12-27 RX ORDER — CALCIUM CARBONATE 500(1250)
500 TABLET ORAL DAILY
COMMUNITY

## 2019-12-27 RX ORDER — TURMERIC 400 MG
CAPSULE ORAL
COMMUNITY

## 2019-12-27 ASSESSMENT — ENCOUNTER SYMPTOMS
SHORTNESS OF BREATH: 0
EYE REDNESS: 0
EYE DISCHARGE: 0
RHINORRHEA: 0
CHEST TIGHTNESS: 1
NAUSEA: 0
DIARRHEA: 0
VOMITING: 0
SORE THROAT: 1
COUGH: 1

## 2020-01-09 ENCOUNTER — OFFICE VISIT (OUTPATIENT)
Dept: FAMILY MEDICINE CLINIC | Age: 65
End: 2020-01-09
Payer: COMMERCIAL

## 2020-01-09 VITALS
HEART RATE: 68 BPM | BODY MASS INDEX: 26.43 KG/M2 | HEIGHT: 64 IN | DIASTOLIC BLOOD PRESSURE: 68 MMHG | SYSTOLIC BLOOD PRESSURE: 104 MMHG | OXYGEN SATURATION: 97 % | WEIGHT: 154.8 LBS | TEMPERATURE: 97.9 F

## 2020-01-09 PROCEDURE — 99212 OFFICE O/P EST SF 10 MIN: CPT | Performed by: FAMILY MEDICINE

## 2020-01-09 SDOH — ECONOMIC STABILITY: INCOME INSECURITY: HOW HARD IS IT FOR YOU TO PAY FOR THE VERY BASICS LIKE FOOD, HOUSING, MEDICAL CARE, AND HEATING?: NOT HARD AT ALL

## 2020-01-09 SDOH — ECONOMIC STABILITY: FOOD INSECURITY: WITHIN THE PAST 12 MONTHS, YOU WORRIED THAT YOUR FOOD WOULD RUN OUT BEFORE YOU GOT MONEY TO BUY MORE.: NEVER TRUE

## 2020-01-09 SDOH — ECONOMIC STABILITY: FOOD INSECURITY: WITHIN THE PAST 12 MONTHS, THE FOOD YOU BOUGHT JUST DIDN'T LAST AND YOU DIDN'T HAVE MONEY TO GET MORE.: NEVER TRUE

## 2020-01-09 SDOH — ECONOMIC STABILITY: TRANSPORTATION INSECURITY
IN THE PAST 12 MONTHS, HAS LACK OF TRANSPORTATION KEPT YOU FROM MEETINGS, WORK, OR FROM GETTING THINGS NEEDED FOR DAILY LIVING?: NO

## 2020-01-09 SDOH — ECONOMIC STABILITY: TRANSPORTATION INSECURITY
IN THE PAST 12 MONTHS, HAS THE LACK OF TRANSPORTATION KEPT YOU FROM MEDICAL APPOINTMENTS OR FROM GETTING MEDICATIONS?: NO

## 2020-01-09 ASSESSMENT — PATIENT HEALTH QUESTIONNAIRE - PHQ9
SUM OF ALL RESPONSES TO PHQ QUESTIONS 1-9: 0
2. FEELING DOWN, DEPRESSED OR HOPELESS: 0
SUM OF ALL RESPONSES TO PHQ QUESTIONS 1-9: 0
SUM OF ALL RESPONSES TO PHQ9 QUESTIONS 1 & 2: 0
1. LITTLE INTEREST OR PLEASURE IN DOING THINGS: 0

## 2020-01-09 ASSESSMENT — ENCOUNTER SYMPTOMS
SORE THROAT: 1
COUGH: 1
WHEEZING: 0
SHORTNESS OF BREATH: 0

## 2020-01-09 NOTE — PROGRESS NOTES
[facility]ESSIONAL SERV[facility]  [facility]  [address] [place] [address]  Dept: [phone]  Dept Fax: [phone]  Loc: [phone]  PROGRESS NOTE      Visit Date: 1/9/2020    Ricardo Akhtar is a 62 y.o. female who presents today for:  Chief Complaint   Patient presents with    Fever    Head Congestion    Pharyngitis     scrachy throat       Subjective:  HPI    Sore throat started 2 days ago and feels better this morning. Fever yesterday. Has body aches. No diarrhea. Has mild cough. She is taking theraflu which helps. Eating and drinking ok. Treated on 12/27 with 5 days of azithromycin. She was better for about 1 week until 2 days ago.      was seen yesterday and placed on antibiotic. Review of Systems   Constitutional: Positive for fever. Negative for chills. HENT: Positive for congestion and sore throat. Respiratory: Positive for cough. Negative for shortness of breath and wheezing. Neurological: Positive for headaches. Past Medical History:   Diagnosis Date    Thyroid disease       Current Outpatient Medications   Medication Sig Dispense Refill    calcium carbonate (OSCAL) 500 MG TABS tablet Take 500 mg by mouth daily      Turmeric 400 MG CAPS Take by mouth      SYNTHROID 100 MCG tablet Take 1 tablet by mouth Daily 90 tablet 3    Ascorbic Acid (VITAMIN C) 500 MG tablet Take 500 mg by mouth daily      Cyanocobalamin (VITAMIN B 12 PO) Take by mouth      Omega-3 Fatty Acids (FISH OIL) 1000 MG CAPS Take 3,000 mg by mouth 3 times daily       No current facility-administered medications for this visit. No Known Allergies    Objective:     /68 (Site: Left Upper Arm, Position: Sitting, Cuff Size: Medium Adult)   Pulse 68   Temp 97.9 °F (36.6 °C) (Oral)   Ht 5' 4\" (1.626 m)   Wt 154 lb 12.8 oz (70.2 kg)   SpO2 97%   BMI 26.57 kg/m²   Physical Exam  Vitals signs reviewed.    Constitutional:       General: She is not in acute distress. Appearance: She is not ill-appearing. HENT:      Right Ear: Tympanic membrane and ear canal normal.      Left Ear: Tympanic membrane and ear canal normal.      Nose: Congestion present. Mouth/Throat:      Mouth: Mucous membranes are moist.      Pharynx: No oropharyngeal exudate or posterior oropharyngeal erythema. Cardiovascular:      Rate and Rhythm: Normal rate and regular rhythm. Heart sounds: No murmur. Pulmonary:      Effort: Pulmonary effort is normal. No respiratory distress. Breath sounds: Normal breath sounds. No wheezing or rhonchi. Neurological:      Mental Status: She is alert. Impression/Plan:  1. Viral URI  Normal progression of disease discussed. All questions answered. Explained the rationale for symptomatic treatment rather than use of an antibiotic. Instruction provided in the use of fluids, acetaminophen, and other OTC medication for symptom control. Extra fluids  Analgesics as needed, dose reviewed. Follow up as needed should symptoms fail to improve. Offered strep testing which she declined as she feels like her sore throat is improved today. They voiced understanding. All questions answered. They agreed with treatment plan. See patient instructions for any educational materials that may have been given. Discussed use, benefit, and side effects of prescribed medications. (Please note that portions of this note may have been completed with a voice recognition program.  Efforts were made to edit the dictation but occasionally words are mis-transcribed.)    Return if symptoms worsen or fail to improve.        Electronically signed by Makayla Gupta MD on 1/9/2020 at 10:05 AM

## 2020-06-12 ENCOUNTER — HOSPITAL ENCOUNTER (OUTPATIENT)
Dept: GENERAL RADIOLOGY | Age: 65
Discharge: HOME | End: 2020-06-12
Payer: COMMERCIAL

## 2020-06-12 ENCOUNTER — OFFICE VISIT (OUTPATIENT)
Dept: FAMILY MEDICINE CLINIC | Age: 65
End: 2020-06-12
Payer: COMMERCIAL

## 2020-06-12 ENCOUNTER — HOSPITAL ENCOUNTER (OUTPATIENT)
Age: 65
Discharge: HOME | End: 2020-06-12
Payer: COMMERCIAL

## 2020-06-12 VITALS
BODY MASS INDEX: 26.98 KG/M2 | TEMPERATURE: 97.9 F | WEIGHT: 158 LBS | HEIGHT: 64 IN | DIASTOLIC BLOOD PRESSURE: 64 MMHG | HEART RATE: 80 BPM | SYSTOLIC BLOOD PRESSURE: 102 MMHG

## 2020-06-12 DIAGNOSIS — E03.8 OTHER SPECIFIED HYPOTHYROIDISM: ICD-10-CM

## 2020-06-12 PROBLEM — M51.369 DDD (DEGENERATIVE DISC DISEASE), LUMBAR: Status: ACTIVE | Noted: 2020-06-12

## 2020-06-12 PROBLEM — M51.36 DDD (DEGENERATIVE DISC DISEASE), LUMBAR: Status: ACTIVE | Noted: 2020-06-12

## 2020-06-12 LAB — TSH SERPL DL<=0.05 MIU/L-ACNC: 3.6 UIU/ML (ref 0.4–4.2)

## 2020-06-12 PROCEDURE — 36415 COLL VENOUS BLD VENIPUNCTURE: CPT

## 2020-06-12 PROCEDURE — 72100 X-RAY EXAM L-S SPINE 2/3 VWS: CPT

## 2020-06-12 PROCEDURE — 99214 OFFICE O/P EST MOD 30 MIN: CPT | Performed by: FAMILY MEDICINE

## 2020-06-12 PROCEDURE — 84443 ASSAY THYROID STIM HORMONE: CPT

## 2020-06-12 RX ORDER — LEVOTHYROXINE SODIUM 100 MCG
100 TABLET ORAL DAILY
Qty: 90 TABLET | Refills: 3 | Status: SHIPPED | OUTPATIENT
Start: 2020-06-12 | End: 2021-05-28

## 2020-06-12 ASSESSMENT — ENCOUNTER SYMPTOMS: BACK PAIN: 1

## 2020-06-12 NOTE — PATIENT INSTRUCTIONS
Patient Education        Patellofemoral Pain Syndrome (Runner's Knee): Exercises  Introduction  Here are some examples of exercises for you to try. The exercises may be suggested for a condition or for rehabilitation. Start each exercise slowly. Ease off the exercises if you start to have pain. You will be told when to start these exercises and which ones will work best for you. How to do the exercises  Calf wall stretch   1. Stand facing a wall with your hands on the wall at about eye level. Put your affected leg about a step behind your other leg. 2. Keeping your back leg straight and your back heel on the floor, bend your front knee and gently bring your hip and chest toward the wall until you feel a stretch in the calf of your back leg. 3. Hold the stretch for at least 15 to 30 seconds. 4. Repeat 2 to 4 times. 5. Repeat steps 1 through 4, but this time keep your back knee bent. Quadriceps stretch   1. If you are not steady on your feet, hold on to a chair, counter, or wall. 2. Bend your affected leg, and reach behind you to grab the front of your foot or ankle with the hand on the same side. For example, if you are stretching your right leg, use your right hand. 3. Keeping your knees next to each other, pull your foot toward your buttock until you feel a gentle stretch across the front of your hip and down the front of your thigh. Your knee should be pointed directly to the ground, and not out to the side. 4. Hold the stretch for at least 15 to 30 seconds. 5. Repeat 2 to 4 times. Hamstring wall stretch   1. Lie on your back in a doorway, with your good leg through the open door. 2. Slide your affected leg up the wall to straighten your knee. You should feel a gentle stretch down the back of your leg. 3. Hold the stretch for at least 1 minute. Then over time, try to lengthen the time you hold the stretch to as long as 6 minutes. 4. Repeat 2 to 4 times.   5. If you do not have a place to do this exercise in a doorway, there is another way to do it:  6. Lie on your back, and bend your affected leg. 7. Loop a towel under the ball and toes of that foot, and hold the ends of the towel in your hands. 8. Straighten your knee, and slowly pull back on the towel. You should feel a gentle stretch down the back of your leg. 9. Hold the stretch for at least 15 to 30 seconds. Or even better, hold the stretch for 1 minute if you can. 10. Repeat 2 to 4 times. 1. Do not arch your back. 2. Do not bend either knee. 3. Keep one heel touching the floor and the other heel touching the wall. Do not point your toes. Quad sets   1. Sit with your affected leg straight and supported on the floor or a firm bed. Place a small, rolled-up towel under your affected knee. Your other leg should be bent, with that foot flat on the floor. 2. Tighten the thigh muscles of your affected leg by pressing the back of your knee down into the towel. 3. Hold for about 6 seconds, then rest for up to 10 seconds. 4. Repeat 8 to 12 times. Straight-leg raises to the front   1. Lie on your back with your good knee bent so that your foot rests flat on the floor. Your affected leg should be straight. Make sure that your low back has a normal curve. You should be able to slip your hand in between the floor and the small of your back, with your palm touching the floor and your back touching the back of your hand. 2. Tighten the thigh muscles in your affected leg by pressing the back of your knee flat down to the floor. Hold your knee straight. 3. Keeping the thigh muscles tight and your leg straight, lift your affected leg up so that your heel is about 12 inches off the floor. 4. Hold for about 6 seconds, then lower your leg slowly. Rest for up to 10 seconds between repetitions. 5. Repeat 8 to 12 times. Straight-leg raises to the back   1. Lie on your stomach, and lift your leg straight up behind you (toward the ceiling).   2. Lift your

## 2020-06-23 ENCOUNTER — HOSPITAL ENCOUNTER (OUTPATIENT)
Dept: PHYSICAL THERAPY | Age: 65
Setting detail: THERAPIES SERIES
Discharge: HOME | End: 2020-06-23
Payer: COMMERCIAL

## 2020-06-23 PROCEDURE — 97161 PT EVAL LOW COMPLEX 20 MIN: CPT

## 2020-06-23 PROCEDURE — 97110 THERAPEUTIC EXERCISES: CPT

## 2020-06-23 PROCEDURE — G0283 ELEC STIM OTHER THAN WOUND: HCPCS

## 2020-06-23 ASSESSMENT — PAIN SCALES - QUEBEC BACK PAIN DISABILITY SCALE
QUEBEC DISABILITY INDEX: 40-59%
QUEBEC CMS MODIFIER: CK
BEND OVER TO CLEAN THE BATHTUB: 4
CLIMB ONE FLIGHT OF STAIRS: 2
SLEEP THROUGH THE NIGHT: 0
MOVE A CHAIR: 2
WALK A FEW BLOCKS OR 300 TO 400M: 2
REACH UP TO HIGH SHELVES: 2
GET OUT OF BED: 3
RUN ONE BLOCK OR 100M: 5
LIFT AND CARRY A HEAVY SUITCASE: 4
THROW A BALL: 2
TAKE FOOD OUT OF THE REFRIGERATOR: 2
PULL OR PUSH HEAVY DOORS: 2
RIDE IN A CAR: 0
MAKE YOUR BED: 2
STAND UP FOR 20 TO 30 MINUTES: 0
PUT ON SOCKS OR PANYHOSE: 3
CARRY TWO BAGS OF GROCERIES: 3
TURN OVER IN BED: 4
WALK SEVERAL KILOMETERS  OR MILES: 5
TOTAL SCORE: 47
SIT IN A CHAIR FOR SEVERAL HOURS: 0

## 2020-06-23 ASSESSMENT — PAIN SCALES - GENERAL: PAINLEVEL_OUTOF10: 3

## 2020-06-23 ASSESSMENT — PAIN DESCRIPTION - LOCATION: LOCATION: BACK;BUTTOCKS

## 2020-06-23 ASSESSMENT — PAIN DESCRIPTION - ORIENTATION: ORIENTATION: RIGHT

## 2020-06-23 NOTE — PROGRESS NOTES
1-2 months. In fall , patient started with constant with LBP and R buttock. Patient went to family MD on 6/12 for wellness check and mentioned LBP, had xray and referred to PT.         Pain:  Patient Currently in Pain: Yes  Pain Assessment: 0-10  Pain Level: 3  Pain Location: Back, Buttocks  Pain Orientation: Right  Pain Radiating Towards: LBP and R Buttock pain 100% of the day, high 10/10, average 4/10     Social/Functional History:    Type of Home: House  Home Layout: Two level, Able to Live on Main level with bedroom/bathroom, Laundry in basement( carrying laundry basket up and down steps)  Home Access: Stairs to enter with rails  Entrance Stairs - Number of Steps: 5(pain going up steps in LBP 6/10- also L knee pain)  Entrance Stairs - Rails: Right     Bathroom Shower/Tub: Walk-in shower  Bathroom Toilet: Standard       ADL Assistance: Independent  Homemaking Assistance: Independent(increased pain with vacuuming, )  Homemaking Responsibilities: Yes  Ambulation Assistance: Independent  Transfer Assistance: Independent    Active : Yes  Mode of Transportation: Car  Occupation: Part time employment  Type of occupation: [employer] - works 3 days per week, working from home currently is scheduled to return to office on 6/29- works [employer] [address]: anjoys walking for exercise 3 days per week, enjoys biking- has not done this x 3 weeks due to LBP and L knee pain  Additional Comments: increased LBP with sit to stand from couch 10/10, after walking 3 x per week for 1-2 miles pain 8/10, getting in/out of car pain 6/10, bending do yard work pain 8/10, increased pain bending over to put on socks and shoes, increased pain getting OOB in morning 7/10    Objective                Vision: Within Functional Limits    Hearing: Within functional limits                                                     Lumbar: AROM lumbar flexion 50% with LBP 6/10, extension 75%, lateral flexion 75% R and L    ROM

## 2020-06-24 ENCOUNTER — HOSPITAL ENCOUNTER (OUTPATIENT)
Dept: PHYSICAL THERAPY | Age: 65
Setting detail: THERAPIES SERIES
Discharge: HOME | End: 2020-06-24
Payer: COMMERCIAL

## 2020-06-24 PROCEDURE — 97110 THERAPEUTIC EXERCISES: CPT

## 2020-06-24 ASSESSMENT — PAIN DESCRIPTION - LOCATION: LOCATION: BACK;BUTTOCKS

## 2020-06-24 ASSESSMENT — PAIN SCALES - GENERAL: PAINLEVEL_OUTOF10: 2

## 2020-06-24 ASSESSMENT — PAIN DESCRIPTION - ORIENTATION: ORIENTATION: RIGHT

## 2020-06-24 NOTE — PROGRESS NOTES
Decreased strength, Increased pain  Assessment: Progressed with more strengthening exercises today as noted with patient tolerating  well. Patient reporting a little pain with certain movement but only reporting pain level 1/10 at end of session. Prognosis: Excellent     Patient Education:continue with HEP and monitor response to progressions. Plan:  Times per week: 2 x per week,   Plan weeks: 5 weeks  Specific instructions for Next Treatment: gentle DLSP, stretching, AROM, strengthening, NuStep, avoid increased pain, progress to standing exercises.   Modalities prn including IFC to lessen pain  Current Treatment Recommendations: ROM, Modalities, Patient/Caregiver Education & Training, Strengthening, Home Exercise Program    Goals:  Patient goals : to get me back pain to go away    Short term goals  Time Frame for Short term goals: deferred to LTG's    Long term goals  Time Frame for Long term goals : 5 weeks  Long term goal 1: increase AROM lumbar flexion to 75%, B hip flexion to 80 degrees to allow patient to report able to get in/out of bed and care with decreased LBP to 2/10  Long term goal 2: increase abdominal strength to fair, LE to 4/5 to allow patient to report able to walk x 15 minutes for exercise with decreased LBP to 2/10  Long term goal 3: I with HEP as prescribed to allow patient to report able to get OOB in morning with decreased LBP to 3/10    Rebeka Espana, [id number]

## 2020-06-29 ENCOUNTER — HOSPITAL ENCOUNTER (OUTPATIENT)
Dept: PHYSICAL THERAPY | Age: 65
Setting detail: THERAPIES SERIES
Discharge: HOME | End: 2020-06-29
Payer: COMMERCIAL

## 2020-06-29 PROCEDURE — G0283 ELEC STIM OTHER THAN WOUND: HCPCS

## 2020-06-29 PROCEDURE — 97110 THERAPEUTIC EXERCISES: CPT

## 2020-06-29 ASSESSMENT — PAIN DESCRIPTION - LOCATION: LOCATION: BACK

## 2020-06-29 ASSESSMENT — PAIN SCALES - GENERAL: PAINLEVEL_OUTOF10: 1

## 2020-06-29 NOTE — PROGRESS NOTES
[address] PHYSICAL THERAPY  DAILY NOTE  [address] [facility].     Time In: 7409  Time Out: [phone]  Minutes: 30  Timed Code Treatment Minutes: 20 Minutes                Date: 2020  Patient Name: Ct Temple,  Gender:  female        CSN: [id number]   : 1955  (62 y.o.)  Referral Date : 20    Referring Practitioner: Dr. Issac Castanon      Diagnosis: chronic LBP without sciatica  Treatment Diagnosis: LBP, difficulty walking   Additional Pertinent Hx: see medical history form, reviewed meds and NKA. General:  PT Visit Information  Onset Date: 20  PT Insurance Information: [place] BC/BS- pays at 80%, modalities covered at 16%, need precert after 01QN. Total # of Visits Approved: 30  Total # of Visits to Date: 3  Plan of Care/Certification Expiration Date: 20  Progress Note Due Date: 20  Progress Note Counter: 3/10 for PN. Subjective:  Comments: 2/10 for PN. cert due .      Subjective: reports pain today /10, feeling better except when bending and lifting to do yard work this weekend and pain 6/10     Pain:  Patient Currently in Pain: Yes  Pain Level: 1  Pain Location: Back      Objective                                                                                                                          Exercises  Exercise 1: pelvic tilt 15 x 5 seconds  Exercise 2: abdominal bracing with UE fleixon to 90 degrees alternating arms 15 x  Exercise 3: abdominal bracing with quad set R then L 15 x 5 seconds  Exercise 4: abdominal bracing with SLR no hold  Exercise 5: abdominal bracing with hip adduction (ball) 15 x 5 seconds  Exercise 6: LTR 10 x 5 seoncds  Exercise 7: gentle SKTC 10 seoncds x 5 bilat,   Exercise 8: seated LAQ 10 x 5 seconds - cues needed for posture   Exercise 9: seated LAQ 10 x 3 seconds - cues needed for posture   Exercise 10: IFC 2 channels crossed at lumbar spine with patient left side

## 2020-07-01 ENCOUNTER — HOSPITAL ENCOUNTER (OUTPATIENT)
Dept: PHYSICAL THERAPY | Age: 65
Setting detail: THERAPIES SERIES
Discharge: HOME | End: 2020-07-01
Payer: COMMERCIAL

## 2020-07-01 PROCEDURE — 97110 THERAPEUTIC EXERCISES: CPT

## 2020-07-01 NOTE — PROGRESS NOTES
[address] PHYSICAL THERAPY  DAILY NOTE  600 [facility].     Time In: 0800  Time Out: 2495  Minutes: 32  Timed Code Treatment Minutes: 32 Minutes                Date: 2020  Patient Name: Ayleen Kaplan,  Gender:  female        CSN: [id number]   : 1955  (62 y.o.)  Referral Date : 20    Referring Practitioner: Dr. Neris Pascual      Diagnosis: chronic LBP without sciatica  Treatment Diagnosis: LBP, difficulty walking   Additional Pertinent Hx: see medical history form, reviewed meds and NKA. General:  PT Visit Information  Onset Date: 20  PT Insurance Information: [place] BC/BS- pays at 80%, modalities covered at 65%, need precert after 67UH. Total # of Visits Approved: 30  Total # of Visits to Date: 4  Plan of Care/Certification Expiration Date: 20  Progress Note Counter: 4/10 for PN. Subjective:  Comments: 2/10 for PN. cert due . Subjective: Patient reporting pain level 1/10 but has been taking alleve frequently for pain.      Pain:1/0 low back            Objective  Exercises  Exercise 1: pelvic tilt 15 x 5 seconds  Exercise 2: abdominal bracing with UE fleixon to 90 degrees alternating arms 20 x  Exercise 3: abdominal bracing with quad set R then L 20 x 5 seconds  Exercise 4: abdominal bracing with SLR no hold x 10  Exercise 5: abdominal bracing with hip adduction (ball) 15 x 5 seconds  Exercise 6: LTR 10 x 5 seoncds  Exercise 7: gentle SKTC 10 seoncds x 5 bilat,   Exercise 8: side lying clam shell and hip abduction x 10 bilat no hold  Exercise 9: seated LAQ 10 x 5 seconds - cues needed for posture   Exercise 10: heel raises, marching, hip flexion and hip abduction x 10 each with bracing  Exercise 11: total gym squats with pelvic tilt hold during x 15  Exercise 12: hip flexor, hamstring and calf stretch at step 15 seconds x 3 each          Activity Tolerance:  Activity Tolerance: Patient Tolerated treatment well    Assessment: Body structures, Functions, Activity limitations: Decreased ROM, Decreased strength, Increased pain  Assessment: Progressed with standing exercises with patient tolerating well. Patient denies having no pain at end of session. Prognosis: Excellent       Patient Education:monitor response to progressions. Plan:  Times per week: 2 x per week,   Plan weeks: 5 weeks  Specific instructions for Next Treatment: gentle DLSP, stretching, AROM, strengthening, NuStep, avoid increased pain, progress to standing exercises.   Modalities prn including IFC to lessen pain  Current Treatment Recommendations: ROM, Modalities, Patient/Caregiver Education & Training, Strengthening, Home Exercise Program    Goals:  Patient goals : to get me back pain to go away    Short term goals  Time Frame for Short term goals: deferred to LTG's    Long term goals  Time Frame for Long term goals : 5 weeks  Long term goal 1: increase AROM lumbar flexion to 75%, B hip flexion to 80 degrees to allow patient to report able to get in/out of bed and care with decreased LBP to 2/10  Long term goal 2: increase abdominal strength to fair, LE to 4/5 to allow patient to report able to walk x 15 minutes for exercise with decreased LBP to 2/10  Long term goal 3: I with HEP as prescribed to allow patient to report able to get OOB in morning with decreased LBP to 3/10    DAVID [id number]

## 2020-07-08 ENCOUNTER — HOSPITAL ENCOUNTER (OUTPATIENT)
Dept: PHYSICAL THERAPY | Age: 65
Setting detail: THERAPIES SERIES
Discharge: HOME | End: 2020-07-08
Payer: COMMERCIAL

## 2020-07-08 PROCEDURE — 97110 THERAPEUTIC EXERCISES: CPT

## 2020-07-08 PROCEDURE — G0283 ELEC STIM OTHER THAN WOUND: HCPCS

## 2020-07-08 ASSESSMENT — PAIN DESCRIPTION - LOCATION: LOCATION: BACK

## 2020-07-08 ASSESSMENT — PAIN SCALES - GENERAL: PAINLEVEL_OUTOF10: 4

## 2020-07-08 NOTE — PROGRESS NOTES
[facility] [facility] THERAPY  DAILY NOTE  600 [facility]     Time In: 2781  Time Out: 1600  Minutes: 30  Timed Code Treatment Minutes: 20 Minutes                Date: 2020  Patient Name: Amauri Murphy,  Gender:  female        CSN: [id number]   : 1955  (62 y.o.)  Referral Date : 20    Referring Practitioner: Dr. Alma Jennings      Diagnosis: chronic LBP without sciatica  Treatment Diagnosis: LBP, difficulty walking   Additional Pertinent Hx: see medical history form, reviewed meds and NKA. General:  PT Visit Information  Onset Date: 20  PT Insurance Information: [place] BC/BS- pays at 80%, modalities covered at 99%, need precert after 16VQ. Total # of Visits Approved: 30  Total # of Visits to Date: 5  Plan of Care/Certification Expiration Date: 20  Progress Note Due Date: 20  Progress Note Counter: 5/10 for PN. Subjective:  Comments: 5/10 for PN. cert due 8252. Subjective: reports LBP had been 1/10 [place] 1 11year old grandkids and in/out of pack and play and LBP increased to 6/10, back down to 4/10 today.   Came at wrong time but PT able to see patient due to cancellation     Pain:  Patient Currently in Pain: Yes  Pain Assessment: 0-10  Pain Level: 4  Pain Location: Back      Objective                                                                                                                          Exercises  Exercise 1: pelvic tilt 15 x 5 seconds  Exercise 2: abdominal bracing with UE fleixon to 90 degrees alternating arms 15 x  Exercise 3: abdominal bracing with quad set R then L 15 x 5 seconds  Exercise 4: abdominal bracing with SLR no hold x 10  Exercise 5: abdominal bracing with hip adduction (ball) 15 x 5 seconds  Exercise 6: LTR 10 x 5 seoncds  Exercise 7:    Exercise 8:    Exercise 9: seated LAQ 10 x 5 seconds - cues needed for posture Exercise 10:    Exercise 11:    Exercise 12: Activity Tolerance:  Activity Tolerance: Patient Tolerated treatment well    Assessment: Body structures, Functions, Activity limitations: Decreased ROM, Decreased strength, Increased pain  Assessment: backed off with exercises to neutral position as pain levels increased and reviewed posture and body mechanics  Prognosis: Excellent  REQUIRES PT FOLLOW UP: Yes    Patient Education:  Patient Education: reviewed HEP of above exercises only, advised to walk only 15 minutes for exercise and hold off if increased LBP  Barriers to Learning: none                      Plan:  Times per week: 2 x per week,   Plan weeks: 5 weeks  Specific instructions for Next Treatment: gentle DLSP, stretching, AROM, strengthening, NuStep, avoid increased pain, progress to standing exercises. Modalities prn including IFC to lessen pain  Current Treatment Recommendations: ROM, Modalities, Patient/Caregiver Education & Training, Strengthening, Home Exercise Program    Goals:  Patient goals : to get me back pain to go away    Short term goals  Time Frame for Short term goals: deferred to LTG's    Long term goals  Time Frame for Long term goals : 5 weeks  Long term goal 1: increase AROM lumbar flexion to 75%, B hip flexion to 80 degrees to allow patient to report able to get in/out of bed and care with decreased LBP to 2/10  Long term goal 2: increase abdominal strength to fair, LE to 4/5 to allow patient to report able to walk x 15 minutes for exercise with decreased LBP to 2/10  Long term goal 3: I with HEP as prescribed to allow patient to report able to get OOB in morning with decreased LBP to 3/10    Georgiana Gonzalez.  Morteza He # [id number]

## 2020-07-10 ENCOUNTER — HOSPITAL ENCOUNTER (OUTPATIENT)
Dept: PHYSICAL THERAPY | Age: 65
Setting detail: THERAPIES SERIES
Discharge: HOME | End: 2020-07-10
Payer: COMMERCIAL

## 2020-07-10 PROCEDURE — G0283 ELEC STIM OTHER THAN WOUND: HCPCS

## 2020-07-10 PROCEDURE — 97110 THERAPEUTIC EXERCISES: CPT

## 2020-07-10 ASSESSMENT — PAIN SCALES - GENERAL: PAINLEVEL_OUTOF10: 1

## 2020-07-10 ASSESSMENT — PAIN DESCRIPTION - LOCATION: LOCATION: BACK

## 2020-07-10 NOTE — PROGRESS NOTES
[facility] [address]  OUTPATIENT PHYSICAL THERAPY  DAILY NOTE  600 [facility].     Time In: 1300  Time Out: 1330  Minutes: 30  Timed Code Treatment Minutes: 20 Minutes                Date: 7/10/2020  Patient Name: Tati Lara,  Gender:  female        CSN: [id number]   : 1955  (62 y.o.)  Referral Date : 20    Referring Practitioner: Dr. Severiano Siskin      Diagnosis: chronic LBP without sciatica  Treatment Diagnosis: LBP, difficulty walking   Additional Pertinent Hx: see medical history form, reviewed meds and NKA. General:  PT Visit Information  Onset Date: 20  PT Insurance Information: [place] BC/BS- pays at 80%, modalities covered at 87%, need precert after 10ZA. Total # of Visits Approved: 30  Total # of Visits to Date: 6  Plan of Care/Certification Expiration Date: 20  Progress Note Due Date: 20  Progress Note Counter: 6/10 for PN. Subjective:  Comments: 5/10 for PN. cert due 6474. Subjective: patient reports LBP lessening 1/10 today, 2/10 high, feeling much better     Pain:  Patient Currently in Pain: Yes  Pain Assessment: 0-10  Pain Level: 1  Pain Location: Back      Objective                                                                                                                          Exercises  Exercise 1: pelvic tilt 15 x 5 seconds  Exercise 2: abdominal bracing with UE fleixon to 90 degrees alternating arms 15 x  Exercise 3: abdominal bracing with quad set R then L 15 x 5 seconds  Exercise 4: abdominal bracing with SLR no hold x 15  Exercise 5: abdominal bracing with hip adduction (ball) 15 x 5 seconds  Exercise 6: LTR 15 x 5 seoncds  Exercise 7: SKTC alternating 10 x 5 seconds  Exercise 8:    Exercise 9: seated LAQ 10 x 5 seconds - cues needed for posture   Exercise 10:    Exercise 11:    Exercise 12:            Activity Tolerance:  Activity Tolerance: Patient Tolerated treatment well    Assessment: Body structures, Functions, Activity limitations: Decreased ROM, Decreased strength, Increased pain  Assessment: added SKTC back to HEP as pain levels less  Prognosis: Excellent  REQUIRES PT FOLLOW UP: Yes    Patient Education:  Patient Education: added SKTC to HEP  Barriers to Learning: none                      Plan:  Times per week: 2 x per week,   Plan weeks: 5 weeks  Specific instructions for Next Treatment: gentle DLSP, stretching, AROM, strengthening, NuStep, avoid increased pain, progress to standing exercises. Modalities prn including IFC to lessen pain  Current Treatment Recommendations: ROM, Modalities, Patient/Caregiver Education & Training, Strengthening, Home Exercise Program    Goals:  Patient goals : to get me back pain to go away    Short term goals  Time Frame for Short term goals: deferred to LTG's    Long term goals  Time Frame for Long term goals : 5 weeks  Long term goal 1: increase AROM lumbar flexion to 75%, B hip flexion to 80 degrees to allow patient to report able to get in/out of bed and care with decreased LBP to 2/10  Long term goal 2: increase abdominal strength to fair, LE to 4/5 to allow patient to report able to walk x 15 minutes for exercise with decreased LBP to 2/10  Long term goal 3: I with HEP as prescribed to allow patient to report able to get OOB in morning with decreased LBP to 3/10    Homero Cartagena # [id number]

## 2020-07-15 ENCOUNTER — HOSPITAL ENCOUNTER (OUTPATIENT)
Dept: PHYSICAL THERAPY | Age: 65
Setting detail: THERAPIES SERIES
Discharge: HOME | End: 2020-07-15
Payer: COMMERCIAL

## 2020-07-15 PROCEDURE — 97110 THERAPEUTIC EXERCISES: CPT

## 2020-07-15 ASSESSMENT — PAIN SCALES - GENERAL: PAINLEVEL_OUTOF10: 0

## 2020-07-15 ASSESSMENT — PAIN DESCRIPTION - LOCATION: LOCATION: BACK

## 2020-07-15 ASSESSMENT — PAIN DESCRIPTION - ORIENTATION: ORIENTATION: RIGHT

## 2020-07-15 NOTE — PROGRESS NOTES
[address] [facility] THERAPY  DAILY NOTE  600 [facility].     Time In:   Time Out: 5380  Minutes: 29  Timed Code Treatment Minutes: 29 Minutes                Date: 7/15/2020  Patient Name: Maryanne Higgins,  Gender:  female        CSN: [id number]   : 1955  (62 y.o.)  Referral Date : 20    Referring Practitioner: Dr. Dominic Osorio      Diagnosis: chronic LBP without sciatica  Treatment Diagnosis: LBP, difficulty walking   Additional Pertinent Hx: see medical history form, reviewed meds and NKA. General:  PT Visit Information  Onset Date: 20  PT Insurance Information: [place] BC/BS- pays at 80%, modalities covered at 61%, need precert after 62HR. Total # of Visits Approved: 30  Total # of Visits to Date: 7  Plan of Care/Certification Expiration Date: 20  Progress Note Counter: 7/10 for PN. Subjective:  Comments: 5/10 for PN. cert due . Subjective: Patient reporting pain level 1/10 with highest pain level being 1/10 within the last day.      Pain:  Patient Currently in Pain: Yes  Pain Level: 0  Pain Location: Back  Pain Orientation: Right      Objective  Exercises  Exercise 1: pelvic tilt 15 x 5 seconds  Exercise 2: abdominal bracing with UE fleixon to 90 degrees alternating arms 15 x  Exercise 3: abdominal bracing with quad set R then L 15 x 5 seconds  Exercise 4: abdominal bracing with SLR no hold x 15  Exercise 5: abdominal bracing with hip adduction (ball) 20 x 5 seconds  Exercise 6: LTR 15 x 5 seoncds  Exercise 7: SKTC alternating 10 x 5 seconds  Exercise 8: seated LAQ 15 x 5 seconds - cues needed for posture  Exercise 9: seated LAQ 15 x 5 seconds - cues needed for posture  Exercise 10: standing with bracing: (oragne) rows, extension, ER and horizontal abduciton x 10  Exercise 11: heel/toe raises, squats 3 way hip x 10 with bracing  Exercise 12: total gym squats x 15, heel raises x 10. calf stretch 20 seconds         Activity Tolerance:  Activity Tolerance: Patient Tolerated treatment well    Assessment: Body structures, Functions, Activity limitations: Decreased ROM, Decreased strength, Increased pain  Assessment: Progressed with standing exercises with emphasis on maintaining abdominal bracing. Patient needing cues to relax upper traps but having no complains at end of session with progressions made. Prognosis: Excellent  REQUIRES PT FOLLOW UP: Yes    Patient Education:relaxing upper traps                        Plan:  Times per week: 2 x per week,   Plan weeks: 5 weeks  Specific instructions for Next Treatment: gentle DLSP, stretching, AROM, strengthening, NuStep, avoid increased pain, progress to standing exercises.   Modalities prn including IFC to lessen pain  Current Treatment Recommendations: ROM, Modalities, Patient/Caregiver Education & Training, Strengthening, Home Exercise Program    Goals:  Patient goals : to get me back pain to go away    Short term goals  Time Frame for Short term goals: deferred to LTG's    Long term goals  Time Frame for Long term goals : 5 weeks  Long term goal 1: increase AROM lumbar flexion to 75%, B hip flexion to 80 degrees to allow patient to report able to get in/out of bed and care with decreased LBP to 2/10  Long term goal 2: increase abdominal strength to fair, LE to 4/5 to allow patient to report able to walk x 15 minutes for exercise with decreased LBP to 2/10  Long term goal 3: I with HEP as prescribed to allow patient to report able to get OOB in morning with decreased LBP to 3/10    Terra Mehta, [id number]

## 2020-07-17 ENCOUNTER — HOSPITAL ENCOUNTER (OUTPATIENT)
Dept: PHYSICAL THERAPY | Age: 65
Setting detail: THERAPIES SERIES
Discharge: HOME | End: 2020-07-17
Payer: COMMERCIAL

## 2020-07-17 PROCEDURE — 97110 THERAPEUTIC EXERCISES: CPT

## 2020-07-17 NOTE — PROGRESS NOTES
[facility]  OUTPATIENT PHYSICAL THERAPY  DAILY NOTE  [address] [facility].     Time In: 0830  Time Out: 0900  Minutes: 30  Timed Code Treatment Minutes: 30 Minutes                Date: 2020  Patient Name: Ayleen Kaplan,  Gender:  female        CSN: [id number]   : 1955  (62 y.o.)  Referral Date : 20    Referring Practitioner: Dr. Neris Pascual      Diagnosis: chronic LBP without sciatica  Treatment Diagnosis: LBP, difficulty walking   Additional Pertinent Hx: see medical history form, reviewed meds and NKA. General:  PT Visit Information  Onset Date: 20  PT Insurance Information: [place] [employer]/[place]- pays at 80%, modalities covered at 32%, need precert after . Total # of Visits Approved: 30  Total # of Visits to Date: 8  Plan of Care/Certification Expiration Date: 20  Progress Note Due Date: 20  Progress Note Counter: 8/10 for PN. Subjective:  Family / Caregiver Present: No  Comments: 5/10 for PN. cert due .      Subjective: reports pain today is 1/10, feels that PT is helping     Pain:  Patient Currently in Pain: Yes     LBP 1/10    Objective                                                                                                                          Exercises  Exercise 1: NuStep seat 4, UE 10, 5 minutes level 1  Exercise 2: abdominal bracing with UE fleixon to 90 degrees alternating arms 15 x  Exercise 3: abdominal bracing with quad set R then L 15 x 5 seconds  Exercise 4: abdominal bracing with SLR no hold x 15  Exercise 5: abdominal bracing with hip adduction (ball) 20 x 5 seconds  Exercise 6: LTR 15 x 5 seoncds  Exercise 7: SKTC alternating 10 x 5 seconds  Exercise 8: seated LAQ 15 x 5 seconds - cues needed for posture  Exercise 10: standing with bracing: (orange) rows, extension,  horizontal abduction x 10         Activity Tolerance:  Activity Tolerance: Patient Tolerated treatment well    Assessment:  Assessment: added NuStep, tband rows for home, and clamshell all without increased pain, patient would like to complete 2-3 more sessions for full 5 weeks. scheduled for Wed and awaiting more help for Friday  Prognosis: Excellent       Patient Education:  Patient Education: orange tband given for rows, extension, horizontal adduction sitting, clamshell- all with handout given                      Plan:  Times per week: 2 x per week,   Plan weeks: 5 weeks  Specific instructions for Next Treatment: gentle DLSP, stretching, AROM, strengthening, NuStep, avoid increased pain, progress to standing exercises. Modalities prn including IFC to lessen pain  Current Treatment Recommendations: ROM, Modalities, Patient/Caregiver Education & Training, Strengthening, Home Exercise Program    Goals:  Patient goals : to get me back pain to go away    Short term goals  Time Frame for Short term goals: deferred to LTG's    Long term goals  Time Frame for Long term goals : 5 weeks  Long term goal 1: increase AROM lumbar flexion to 75%, B hip flexion to 80 degrees to allow patient to report able to get in/out of bed and care with decreased LBP to 2/10  Long term goal 2: increase abdominal strength to fair, LE to 4/5 to allow patient to report able to walk x 15 minutes for exercise with decreased LBP to 2/10  Long term goal 3: I with HEP as prescribed to allow patient to report able to get OOB in morning with decreased LBP to 3/10    Garlan Buckle.  [facility] Decree # [phone]

## 2020-07-22 ENCOUNTER — HOSPITAL ENCOUNTER (OUTPATIENT)
Dept: PHYSICAL THERAPY | Age: 65
Setting detail: THERAPIES SERIES
Discharge: HOME | End: 2020-07-22
Payer: COMMERCIAL

## 2020-07-22 PROCEDURE — 97110 THERAPEUTIC EXERCISES: CPT

## 2020-07-22 NOTE — DISCHARGE SUMMARY
[facility]  OUTPATIENT PHYSICAL THERAPY  DISCHARGE NOTE  [address] [facility].     Time In: 0800  Time Out: 0830  Minutes: 30  Timed Code Treatment Minutes: 30 Minutes                Date: 2020  Patient Name: Farshad Granger,  Gender:  female        CSN: [id number]   : 1955  (62 y.o.)  Referral Date : 20    Referring Practitioner: Dr. Shelton Rose      Diagnosis: chronic LBP without sciatica  Treatment Diagnosis: LBP, difficulty walking   Additional Pertinent Hx: see medical history form, reviewed meds and NKA. General:  PT Visit Information  Onset Date: 20  PT Insurance Information: [place] BC/BS- pays at 80%, modalities covered at 97%, need precert after 08SX. Total # of Visits Approved: 30  Total # of Visits to Date: 5  Plan of Care/Certification Expiration Date: 20  Progress Note Due Date: 20  Progress Note Counter: 9/10 for PN. Subjective:  Chart Reviewed: Yes  Patient assessed for rehabilitation services?: Yes  Family / Caregiver Present: No  Comments: 5/10 for PN. cert due . Subjective: reports pain today is 1/10, feels that PT is helping     Pain:  Patient Currently in Pain: denies         Objective                                                                                                                          Exercises  Exercise 1: NuStep seat 4, UE 10, 5 minutes level 1  Exercise 2: abdominal bracing with UE flexion to 90 degrees alternating arms 15 x  Exercise 3: abdominal bracing with bridging 10 x 5 seconds  Exercise 4: abdominal bracing with SLR no hold x 15  Exercise 5: abdominal bracing with sidelying clamshell  Exercise 6: LTR 15 x 5 seoncds  Exercise 12: 20 x         Activity Tolerance:  Activity Tolerance: Patient Tolerated treatment well    Assessment:  Assessment: patient has met all goals and is I with HEP, education on TENS unit if needed in future.   also education on need to continue with HEP daily and avoid bending and twisting lumbar spine to reduce flare up and return of pain  Prognosis: Excellent  REQUIRES PT FOLLOW UP: No    Patient Education:  Patient Education: HEP reviewed for daily use                      Plan:  Plan Comment: discharge to HEP    Goals:  Patient goals : to get me back pain to go away    Short term goals  Time Frame for Short term goals: deferred to LTG's    Long term goals  Time Frame for Long term goals : 5 weeks  Long term goal 1: increase AROM lumbar flexion to 75%, B hip flexion to 80 degrees to allow patient to report able to get in/out of bed and car with decreased LBP to 2/10. MET- LUMBAR FLEXION 75%, B HIP FLEXION 80, NO PAIN X 5 DAYS  Long term goal 2: increase abdominal strength to fair, LE to 4/5 to allow patient to report able to walk x 15 minutes for exercise with decreased LBP to 2/10. MET ABDOMINAL MUSCLE FAIR, LE 4/5, WALKING 20 MINTUES AT [facility] WT[facility] NO PAIN  Long term goal 3: I with HEP as prescribed to allow patient to report able to get OOB in morning with decreased LBP to 3/10. MET, I WITH HEP AND NO PAIN AT ALL WHEN OOB IN [facility].  [facility] [place] # [id number]

## 2021-01-12 ENCOUNTER — HOSPITAL ENCOUNTER (OUTPATIENT)
Dept: GENERAL RADIOLOGY | Age: 66
Discharge: HOME | End: 2021-01-12
Payer: COMMERCIAL

## 2021-01-12 ENCOUNTER — NURSE TRIAGE (OUTPATIENT)
Dept: OTHER | Facility: CLINIC | Age: 66
End: 2021-01-12

## 2021-01-12 ENCOUNTER — OFFICE VISIT (OUTPATIENT)
Dept: FAMILY MEDICINE CLINIC | Age: 66
End: 2021-01-12
Payer: COMMERCIAL

## 2021-01-12 ENCOUNTER — HOSPITAL ENCOUNTER (OUTPATIENT)
Age: 66
Discharge: HOME | End: 2021-01-12
Payer: COMMERCIAL

## 2021-01-12 VITALS
HEART RATE: 70 BPM | WEIGHT: 157 LBS | DIASTOLIC BLOOD PRESSURE: 78 MMHG | BODY MASS INDEX: 26.8 KG/M2 | SYSTOLIC BLOOD PRESSURE: 118 MMHG | HEIGHT: 64 IN | TEMPERATURE: 98.3 F | OXYGEN SATURATION: 100 %

## 2021-01-12 DIAGNOSIS — M25.562 ACUTE PAIN OF LEFT KNEE: Primary | ICD-10-CM

## 2021-01-12 DIAGNOSIS — M25.562 ACUTE PAIN OF LEFT KNEE: ICD-10-CM

## 2021-01-12 PROCEDURE — 99213 OFFICE O/P EST LOW 20 MIN: CPT | Performed by: FAMILY MEDICINE

## 2021-01-12 PROCEDURE — 73564 X-RAY EXAM KNEE 4 OR MORE: CPT

## 2021-01-12 ASSESSMENT — ENCOUNTER SYMPTOMS: COLOR CHANGE: 0

## 2021-01-12 ASSESSMENT — PATIENT HEALTH QUESTIONNAIRE - PHQ9
2. FEELING DOWN, DEPRESSED OR HOPELESS: 0
SUM OF ALL RESPONSES TO PHQ9 QUESTIONS 1 & 2: 0
1. LITTLE INTEREST OR PLEASURE IN DOING THINGS: 0
SUM OF ALL RESPONSES TO PHQ QUESTIONS 1-9: 0

## 2021-01-12 NOTE — TELEPHONE ENCOUNTER
Over the weekend, coming down a ladder, thought she was on last one, slammed left leg into the ground. Left knee pain and swelling, difficulty with walking and bending, feels stuff moving around in there. Mild swelling. No bruising. Pain 7-8/10. Reason for Disposition   Injury and pain has not improved after 3 days    Answer Assessment - Initial Assessment Questions  1. MECHANISM: \"How did the injury happen? \" (e.g., twisting injury, direct blow)         See above    2. ONSET: \"When did the injury happen? \" (Minutes or hours ago)         See above    3. LOCATION: \"Where is the injury located? \"         See above    4. APPEARANCE of INJURY: \"What does the injury look like? \"         See above    5. SEVERITY: \"Can you put weight on that leg? \" \"Can you walk? \"         Can but hurts    6. SIZE: For cuts, bruises, or swelling, ask: \"How large is it? \" (e.g., inches or centimeters;  entire joint)         See above    7. PAIN: \"Is there pain? \" If so, ask: \"How bad is the pain? \"    (e.g., Scale 1-10; or mild, moderate, severe)        See above    8. TETANUS: For any breaks in the skin, ask: \"When was the last tetanus booster? \"        NA    9. OTHER SYMPTOMS: \"Do you have any other symptoms? \"  (e.g., \"pop\" when knee injured, swelling, locking, buckling)         See above    10. PREGNANCY: \"Is there any chance you are pregnant? \" \"When was your last menstrual period? \"          No    Protocols used: KNEE INJURY-ADULT-OH    Caller provided care advice and instructed to call back with worsening symptoms. Attention Provider: Thank you for allowing me to participate in the care of your patient. The patient was connected to triage in response to information provided to the [facility]. Please do not respond through this encounter as the response is not directed to a shared pool. Warm transfer to [place] [id number] at [facility].

## 2021-01-12 NOTE — PROGRESS NOTES
[facility]  [address] [facility]  Dept: [phone]  Dept Fax: [phone]  Loc: [phone]  PROGRESS NOTE      Visit Date: 1/12/2021    Raulito Fernandez is a 60 y.o. female who presents today for:  Chief Complaint   Patient presents with    Knee Injury     left    Knee Pain     left x 3 days       Subjective:  HPI     Left knee pain:  Hiram Long off 1 step from ladder 3 days ago. Landed on foot and felt like she jammed her knee. Limping around. Has pain with walking. No locking. No swelling. Pain is worse with twisting motions. Taking aleve. Not icing. No bruising. Pain 7-8/10.        Review of Systems   Constitutional: Negative for chills and fever. Musculoskeletal: Positive for arthralgias and gait problem. Negative for joint swelling. Skin: Negative for color change and rash. Patient Active Problem List   Diagnosis    Chest pain, atypical    Hypothyroidism    Family history of early CAD her sister had stent at age 48yrs   [facility] Elevated fasting glucose    DDD (degenerative disc disease), lumbar     Past Medical History:   Diagnosis Date    Thyroid disease       Past Surgical History:   Procedure Laterality Date    CHOLECYSTECTOMY  2005? Dr. Diana Varela COLONOSCOPY  07/18/2017    Dr. Tawanda Gann [facility]    ENDOSCOPY COLONOSCOPY SCREENING performed by Whitley Miller MD at [facility]     Family History   Problem Relation Age of Onset    Heart Disease Father     Diabetes Father     Heart Disease Sister     Cancer Sister         colon    Heart Disease Maternal Grandfather     Stroke Paternal Grandfather      Social History     Tobacco Use    Smoking status: Never Smoker    Smokeless tobacco: Never Used   Substance Use Topics    Alcohol use:  Yes     Alcohol/week: 2.0 standard drinks     Types: 2 Cans of beer per week     Comment: rare      Current Outpatient Medications   Medication Sig Dispense Refill    SYNTHROID 100 MCG tablet Take 1 tablet by mouth Daily 90 tablet 3    calcium carbonate (OSCAL) 500 MG TABS tablet Take 500 mg by mouth daily      Turmeric 400 MG CAPS Take by mouth      Ascorbic Acid (VITAMIN C) 500 MG tablet Take 500 mg by mouth daily      Cyanocobalamin (VITAMIN B 12 PO) Take by mouth      Omega-3 Fatty Acids (FISH OIL) 1000 MG CAPS Take 3,000 mg by mouth 3 times daily       No current facility-administered medications for this visit. No Known Allergies  Health Maintenance   Topic Date Due    Hepatitis C screen  1955    HIV screen  08/14/1971    DTaP/Tdap/Td vaccine (1 - Tdap) 08/14/1975    Shingles Vaccine (2 of 3) 12/25/2013    Cervical cancer screen  04/11/2018    A1C test (Diabetic or Prediabetic)  10/01/2020    Flu vaccine (1) 06/30/2021 (Originally 9/1/2020)    Breast cancer screen  04/26/2021    TSH testing  06/12/2021    Lipid screen  10/10/2021    Colon cancer screen colonoscopy  07/18/2022    Hepatitis A vaccine  Aged Out    Hepatitis B vaccine  Aged Out    Hib vaccine  Aged Out    Meningococcal (ACWY) vaccine  Aged Out    Pneumococcal 0-64 years Vaccine  Aged Out       Objective:  /78 (Site: Left Upper Arm, Position: Sitting)   Pulse 70   Temp 98.3 °F (36.8 °C)   Ht 5' 4\" (1.626 m)   Wt 157 lb (71.2 kg)   SpO2 100%   BMI 26.95 kg/m²   Physical Exam  Vitals signs reviewed. Constitutional:       General: She is not in acute distress. Appearance: She is not ill-appearing. Neurological:      Mental Status: She is alert. Mental status is at baseline. Psychiatric:         Mood and Affect: Mood normal.         Behavior: Behavior normal.       Left knee exam: No tenderness. Minimal joint effusion. Lacking about 2 degrees ext and has flex to 120.  5/5 strength for flexion and extension. Negative Lachman. Negative anterior drawer.   Negative posterior drawer. Negative Alexys. Negative valgus and varus. Has pain with double leg squat. Has antalgic gait. No crepitus of patella-femoral joint. Bilateral calf are soft and nontender. Impression/Plan:  1. Acute pain of left knee  Left knee pain after injury. New problem. Unclear etiology. Minimal effusion. Ligamentously intact. Rule out compression fracture with x-ray. bone contusion would be most likely. Less likely would be a meniscus tear. Recommend ice and oral NSAID such as Aleve. If symptoms persist past 1 to 2 weeks, would like to see her back in the office to reevaluate  - XR KNEE LEFT (MIN 4 VIEWS); Future      They voiced understanding. All questions answered. They agreed with treatment plan. See patient instructions for any educational materials that may have been given. Discussed use, benefit, and side effects of prescribed medications. Reviewed health maintenance. (Please note that portions of this note may have been completed with a voice recognition program.  Efforts were made to edit the dictation but occasionally words are mis-transcribed.)    Return if symptoms worsen or fail to improve.       Electronically signed by Georgeanne Bamberger, MD on 1/12/2021 at 2:11 PM

## 2021-01-12 NOTE — PATIENT INSTRUCTIONS
Patient Education        Knee: Exercises  Introduction  Here are some examples of exercises for you to try. The exercises may be suggested for a condition or for rehabilitation. Start each exercise slowly. Ease off the exercises if you start to have pain. You will be told when to start these exercises and which ones will work best for you. How to do the exercises  Quad sets   1. Sit with your leg straight and supported on the floor or a firm bed. (If you feel discomfort in the front or back of your knee, place a small towel roll under your knee.)  2. Tighten the muscles on top of your thigh by pressing the back of your knee flat down to the floor. (If you feel discomfort under your kneecap, place a small towel roll under your knee.)  3. Hold for about 6 seconds, then rest for up to 10 seconds. 4. Do 8 to 12 repetitions several times a day. Straight-leg raises to the front   1. Lie on your back with your good knee bent so that your foot rests flat on the floor. Your injured leg should be straight. Make sure that your low back has a normal curve. You should be able to slip your flat hand in between the floor and the small of your back, with your palm touching the floor and your back touching the back of your hand. 2. Tighten the thigh muscles in the injured leg by pressing the back of your knee flat down to the floor. Hold your knee straight. 3. Keeping the thigh muscles tight, lift your injured leg up so that your heel is about 12 inches off the floor. Hold for about 6 seconds and then lower slowly. 4. Do 8 to 12 repetitions, 3 times a day. Straight-leg raises to the outside   1. Lie on your side, with your injured leg on top. 2. Tighten the front thigh muscles of your injured leg to keep your knee straight. 3. Keep your hip and your leg straight in line with the rest of your body, and keep your knee pointing forward. Do not drop your hip back. 4. Lift your injured leg straight up toward the ceiling, about 12 inches off the floor. Hold for about 6 seconds, then slowly lower your leg. 5. Do 8 to 12 repetitions. Straight-leg raises to the back   1. Lie on your stomach, and lift your leg straight up behind you (toward the ceiling). 2. Lift your toes about 6 inches off the floor, hold for about 6 seconds, then lower slowly. 3. Do 8 to 12 repetitions. Straight-leg raises to the inside   1. Lie on the side of your body with the injured leg. 2. You can either prop your other (good) leg up on a chair, or you can bend your good knee and put that foot in front of your injured knee. Do not drop your hip back. 3. Tighten the muscles on the front of your thigh to straighten your injured knee. 4. Keep your kneecap pointing forward, and lift your whole leg up toward the ceiling about 6 inches. Hold for about 6 seconds, then lower slowly. 5. Do 8 to 12 repetitions. Heel dig bridging   1. Lie on your back with both knees bent and your ankles bent so that only your heels are digging into the floor. Your knees should be bent about 90 degrees. 2. Then push your heels into the floor, squeeze your buttocks, and lift your hips off the floor until your shoulders, hips, and knees are all in a straight line. 3. Hold for about 6 seconds as you continue to breathe normally, and then slowly lower your hips back down to the floor and rest for up to 10 seconds. 4. Do 8 to 12 repetitions. Hamstring curls   1. Lie on your stomach with your knees straight. If your kneecap is uncomfortable, roll up a washcloth and put it under your leg just above your kneecap. 2. Lift the foot of your injured leg by bending the knee so that you bring the foot up toward your buttock. If this motion hurts, try it without bending your knee quite as far. This may help you avoid any painful motion. 3. Slowly lower your leg back to the floor. 4. Do 8 to 12 repetitions. 5. With permission from your doctor or physical therapist, you may also want to add a cuff weight to your ankle (not more than 5 pounds). With weight, you do not have to lift your leg more than 12 inches to get a hamstring workout. Shallow standing knee bends   Do this exercise only if you have very little pain; if you have no clicking, locking, or giving way if you have an injured knee; and if it does not hurt while you are doing 8 to 12 repetitions. 1. Stand with your hands lightly resting on a counter or chair in front of you. Put your feet shoulder-width apart. 2. Slowly bend your knees so that you squat down like you are going to sit in a chair. Make sure your knees do not go in front of your toes. 3. Lower yourself about 6 inches. Your heels should remain on the floor at all times. 4. Rise slowly to a standing position. Heel raises   1. Stand with your feet a few inches apart, with your hands lightly resting on a counter or chair in front of you. 2. Slowly raise your heels off the floor while keeping your knees straight. 3. Hold for about 6 seconds, then slowly lower your heels to the floor. 4. Do 8 to 12 repetitions several times during the day. Follow-up care is a key part of your treatment and safety. Be sure to make and go to all appointments, and call your doctor if you are having problems. It's also a good idea to know your test results and keep a list of the medicines you take. Where can you learn more? Go to [email or web address] and sign in to your [employer] account. Enter G676 in the [employer] box to learn more about \"Knee: Exercises. \"     If you do not have an account, please click on the \"Sign Up Now\" link. Current as of: March 2, 2020               Content Version: 12.6  © 4018-0841 [employer]. Care instructions adapted under license by [facility] ([facility]). If you have questions about a medical condition or this instruction, always ask your healthcare professional. [address] any warranty or liability for your use of this information.

## 2021-06-08 ENCOUNTER — TELEPHONE (OUTPATIENT)
Dept: FAMILY MEDICINE CLINIC | Age: 66
End: 2021-06-08

## 2021-06-08 DIAGNOSIS — E03.8 OTHER SPECIFIED HYPOTHYROIDISM: Primary | ICD-10-CM

## 2021-06-08 DIAGNOSIS — Z13.1 SCREENING FOR DIABETES MELLITUS: ICD-10-CM

## 2021-06-08 NOTE — TELEPHONE ENCOUNTER
----- Message from Suzanne Reardon sent at 6/8/2021  3:00 PM EDT -----  Subject: Message to Provider    QUESTIONS  Information for Provider? pt has upcoming appt 8/16 for AWV is requesting   blood work orders please advise. She stated she will bring new insurance   card at time of visit.   ---------------------------------------------------------------------------  --------------  CALL BACK INFO  What is the best way for the office to contact you? OK to leave message on   voicemail  Preferred Call Back Phone Number? [phone]  ---------------------------------------------------------------------------  --------------  SCRIPT ANSWERS  Relationship to Patient?  Self

## 2021-08-16 ENCOUNTER — OFFICE VISIT (OUTPATIENT)
Dept: FAMILY MEDICINE CLINIC | Age: 66
End: 2021-08-16
Payer: MEDICARE

## 2021-08-16 VITALS
HEART RATE: 62 BPM | SYSTOLIC BLOOD PRESSURE: 122 MMHG | DIASTOLIC BLOOD PRESSURE: 82 MMHG | HEIGHT: 64 IN | TEMPERATURE: 96.7 F | BODY MASS INDEX: 26.73 KG/M2 | OXYGEN SATURATION: 98 % | RESPIRATION RATE: 14 BRPM | WEIGHT: 156.6 LBS

## 2021-08-16 DIAGNOSIS — Z12.31 BREAST CANCER SCREENING BY MAMMOGRAM: ICD-10-CM

## 2021-08-16 DIAGNOSIS — Z00.00 ROUTINE GENERAL MEDICAL EXAMINATION AT A HEALTH CARE FACILITY: Primary | ICD-10-CM

## 2021-08-16 DIAGNOSIS — E03.8 OTHER SPECIFIED HYPOTHYROIDISM: ICD-10-CM

## 2021-08-16 DIAGNOSIS — Z23 NEED FOR PROPHYLACTIC VACCINATION AGAINST STREPTOCOCCUS PNEUMONIAE (PNEUMOCOCCUS): ICD-10-CM

## 2021-08-16 PROCEDURE — 1123F ACP DISCUSS/DSCN MKR DOCD: CPT | Performed by: FAMILY MEDICINE

## 2021-08-16 PROCEDURE — 3017F COLORECTAL CA SCREEN DOC REV: CPT | Performed by: FAMILY MEDICINE

## 2021-08-16 PROCEDURE — 4040F PNEUMOC VAC/ADMIN/RCVD: CPT | Performed by: FAMILY MEDICINE

## 2021-08-16 PROCEDURE — G0402 INITIAL PREVENTIVE EXAM: HCPCS | Performed by: FAMILY MEDICINE

## 2021-08-16 PROCEDURE — G0009 ADMIN PNEUMOCOCCAL VACCINE: HCPCS | Performed by: FAMILY MEDICINE

## 2021-08-16 PROCEDURE — 90732 PPSV23 VACC 2 YRS+ SUBQ/IM: CPT | Performed by: FAMILY MEDICINE

## 2021-08-16 RX ORDER — LEVOTHYROXINE SODIUM 0.1 MG/1
100 TABLET ORAL DAILY
Qty: 90 TABLET | Refills: 1 | Status: SHIPPED | OUTPATIENT
Start: 2021-08-16 | End: 2022-01-19

## 2021-08-16 SDOH — ECONOMIC STABILITY: FOOD INSECURITY: WITHIN THE PAST 12 MONTHS, YOU WORRIED THAT YOUR FOOD WOULD RUN OUT BEFORE YOU GOT MONEY TO BUY MORE.: NEVER TRUE

## 2021-08-16 SDOH — ECONOMIC STABILITY: FOOD INSECURITY: WITHIN THE PAST 12 MONTHS, THE FOOD YOU BOUGHT JUST DIDN'T LAST AND YOU DIDN'T HAVE MONEY TO GET MORE.: NEVER TRUE

## 2021-08-16 ASSESSMENT — LIFESTYLE VARIABLES
HOW OFTEN DO YOU HAVE A DRINK CONTAINING ALCOHOL: 1
HOW OFTEN DO YOU HAVE SIX OR MORE DRINKS ON ONE OCCASION: 0
HOW OFTEN DURING THE LAST YEAR HAVE YOU NEEDED AN ALCOHOLIC DRINK FIRST THING IN THE MORNING TO GET YOURSELF GOING AFTER A NIGHT OF HEAVY DRINKING: 0
HAS A RELATIVE, FRIEND, DOCTOR, OR ANOTHER HEALTH PROFESSIONAL EXPRESSED CONCERN ABOUT YOUR DRINKING OR SUGGESTED YOU CUT DOWN: 0
HOW OFTEN DURING THE LAST YEAR HAVE YOU HAD A FEELING OF GUILT OR REMORSE AFTER DRINKING: 0
AUDIT-C TOTAL SCORE: 1
HOW OFTEN DURING THE LAST YEAR HAVE YOU FAILED TO DO WHAT WAS NORMALLY EXPECTED FROM YOU BECAUSE OF DRINKING: 0
AUDIT TOTAL SCORE: 1
HOW OFTEN DURING THE LAST YEAR HAVE YOU BEEN UNABLE TO REMEMBER WHAT HAPPENED THE NIGHT BEFORE BECAUSE YOU HAD BEEN DRINKING: 0
HOW OFTEN DURING THE LAST YEAR HAVE YOU FOUND THAT YOU WERE NOT ABLE TO STOP DRINKING ONCE YOU HAD STARTED: 0
HAVE YOU OR SOMEONE ELSE BEEN INJURED AS A RESULT OF YOUR DRINKING: 0
HOW MANY STANDARD DRINKS CONTAINING ALCOHOL DO YOU HAVE ON A TYPICAL DAY: 0

## 2021-08-16 ASSESSMENT — PATIENT HEALTH QUESTIONNAIRE - PHQ9
SUM OF ALL RESPONSES TO PHQ9 QUESTIONS 1 & 2: 0
SUM OF ALL RESPONSES TO PHQ QUESTIONS 1-9: 0
1. LITTLE INTEREST OR PLEASURE IN DOING THINGS: 0
2. FEELING DOWN, DEPRESSED OR HOPELESS: 0

## 2021-08-16 ASSESSMENT — SOCIAL DETERMINANTS OF HEALTH (SDOH): HOW HARD IS IT FOR YOU TO PAY FOR THE VERY BASICS LIKE FOOD, HOUSING, MEDICAL CARE, AND HEATING?: NOT HARD AT ALL

## 2021-08-16 NOTE — PROGRESS NOTES
After obtaining consent, and per orders of Debrah Cabot, MD  Immunization(s) and/or medication(s) given during visit by [email or web address] ([address] [place] [address])      Immunizations Administered     Name Date Dose Route    Pneumococcal Polysaccharide ([id number]) 8/16/2021 0.5 mL Intramuscular    Site: Deltoid- Left    Lot: [id number]    NDC: 4948-3587-94

## 2021-08-16 NOTE — PROGRESS NOTES
Medicare Annual Wellness Visit  Name: Belem Hernandez Date: 2021   MRN: [id number] Sex: Female   Age: 73 y.o. Ethnicity: Non- / Non    : 1955 Race: White (non-)      Marcela Charles is here for Medicare AWV (welcome to medicare) and Hypothyroidism    Screenings for behavioral, psychosocial and functional/safety risks, and cognitive dysfunction are all negative except as indicated below. These results, as well as other patient data from the [address] form, are documented in Flowsheets linked to this Encounter. No Known Allergies    Prior to Visit Medications    Medication Sig Taking? Authorizing Provider   levothyroxine (SYNTHROID) 100 MCG tablet TAKE 1 TABLET DAILY Yes Fuad Medina MD   calcium carbonate (OSCAL) 500 MG TABS tablet Take 500 mg by mouth daily Yes Historical Provider, MD   Turmeric 400 MG CAPS Take by mouth Yes Historical Provider, MD   Ascorbic Acid (VITAMIN C) 500 MG tablet Take 500 mg by mouth daily Yes Historical Provider, MD   Cyanocobalamin (VITAMIN B 12 PO) Take by mouth Yes Historical Provider, MD   Omega-3 Fatty Acids (FISH OIL) 1000 MG CAPS Take 3,000 mg by mouth 3 times daily Yes Historical Provider, MD       Past Medical History:   Diagnosis Date    Thyroid disease        Past Surgical History:   Procedure Laterality Date    CHOLECYSTECTOMY  ?     Dr. Maggi Wilson COLONOSCOPY  2017    Dr. Soraida Encarnacion NOT  [address] IND Left 2017    ENDOSCOPY COLONOSCOPY SCREENING performed by Dawit Ernst MD at [facility]       Family History   Problem Relation Age of Onset    Heart Disease Father     Diabetes Father     Heart Disease Sister     Cancer Sister         colon    Heart Disease Maternal Grandfather     Stroke Paternal Grandfather        CareTeam (Including outside providers/suppliers regularly involved in providing care):   Patient Care Team:  Kristin Fraga MD as PCP - General (Family Medicine)  Kristin Fraga MD as PCP - [facility] Provider    Wt Readings from Last 3 Encounters:   08/16/21 156 lb 9.6 oz (71 kg)   01/12/21 157 lb (71.2 kg)   06/12/20 158 lb (71.7 kg)     Vitals:    08/16/21 0755   BP: 122/82   Site: Left Upper Arm   Position: Sitting   Pulse: 62   Resp: 14   Temp: 96.7 °F (35.9 °C)   SpO2: 98%   Weight: 156 lb 9.6 oz (71 kg)   Height: 5' 4\" (1.626 m)     Body mass index is 26.88 kg/m². Based upon direct observation of the patient, evaluation of cognition reveals recent and remote memory intact. Physical Exam  Vitals reviewed. Constitutional:       Appearance: She is not ill-appearing. Cardiovascular:      Rate and Rhythm: Normal rate and regular rhythm. Heart sounds: No murmur heard. Pulmonary:      Effort: Pulmonary effort is normal. No respiratory distress. Breath sounds: Normal breath sounds. No wheezing or rhonchi. Musculoskeletal:      Right lower leg: No edema. Left lower leg: No edema. Neurological:      Mental Status: She is alert. Mental status is at baseline. Psychiatric:         Mood and Affect: Mood normal.         Behavior: Behavior normal.           Patient's complete Health Risk Assessment and screening values have been reviewed and are found in Flowsheets. The following problems were reviewed today and where indicated follow up appointments were made and/or referrals ordered. Positive Risk Factor Screenings with Interventions:          General Health and ACP:  General  In general, how would you say your health is?: Excellent  In the past 7 days, have you experienced any of the following?  New or Increased Pain, New or Increased Fatigue, Loneliness, Social Isolation, Stress or Anger?: None of These  Do you get the social and emotional support that you need?: Yes  Do you have a Living Will?: Yes  Advance Directives     Power of  Living Will ACP-Advance Directive ACP-Power of     Not on File Not on File Not on File Not on File      General Health Risk Interventions:  · n/a        Personalized Preventive Plan   Current Health Maintenance Status  Immunization History   Administered Date(s) Administered    Zoster Live (Zostavax) 10/30/2013        Health Maintenance   Topic Date Due    Hepatitis C screen  Never done    HIV screen  Never done    DTaP/Tdap/Td vaccine (1 - Tdap) Never done    Shingles Vaccine (2 of 3) 12/25/2013    Cervical cancer screen  04/11/2018    A1C test (Diabetic or Prediabetic)  10/01/2020    Breast cancer screen  04/26/2021    TSH testing  06/12/2021    Pneumococcal 65+ years Vaccine (1 of 1 - PPSV23) Never done    COVID-19 Vaccine (1) 12/31/2021 (Originally 8/14/1967)    Flu vaccine (1) 09/01/2021    Lipid screen  10/10/2021    Colon cancer screen colonoscopy  07/18/2022    DEXA (modify frequency per FRAX score)  Completed    Hepatitis A vaccine  Aged Out    Hepatitis B vaccine  Aged Out    Hib vaccine  Aged Out    Meningococcal (ACWY) vaccine  Aged Out     Recommendations for [employer] Due: see orders and patient instructions/AVS.  . Recommended screening schedule for the next 5-10 years is provided to the patient in written form: see Patient Instructions/AVS.    Amara Andersen was seen today for medicare awv and hypothyroidism. Diagnoses and all orders for this visit:    Routine general medical examination at a health care facility    Other specified hypothyroidism  -     levothyroxine (SYNTHROID) 100 MCG tablet; Take 1 tablet by mouth Daily    Breast cancer screening by mammogram  -     JACKELINE DIGITAL SCREEN W OR WO CAD BILATERAL;  Future    Need for prophylactic vaccination against Streptococcus pneumoniae (pneumococcus)  -     Pneumococcal polysaccharide vaccine 23-valent PPSV23           Sees gyn for pap  dexa normal in 2019    Get BMP and TSH labs previously ordered    F/u 1 year medicare wellness      Mimi Nation MD Mikaela

## 2021-08-16 NOTE — PROGRESS NOTES
Kanika Wolf  1955    1. Are you sick today? no  2. Do you have allergies to medications, food, a vaccine component, or latex? no  3. Have you ever had a serious reaction after receiving a vaccination? no  4. Do you have a long-term health problem with heart, lung, kidney, or metabolic disease (e.g. diabetes), asthma, a blood disorder, no spleen, complement component deficiency, a cochlear implant, or a spinal fluid leak? Are you on long-term aspirin therapy? no  5. Do you have cancer, leukemia, HIV/AIDS, or any other immune system problem? no  6. Do you have a parent, brother, or sister with an immune system problem? no  7. In the past 3 months, have you taken medications that affect your immune system, such as prednisone, other steroids, or anticancer drugs; drugs for the treatment of rheumatoid arthritis, Crohn's disease, or psoriasis; or have you had radiation treatment? no  8. Have you had a seizure or a brain or other nervous system problem? no  9. During the past year, have you received a transfusion of blood or blood products, or been given immune (gamma) globulin or an antiviral drug? no  10. For women: Are you pregnant or is there a chance you could become pregnant during the next month? no  11. Have you received any vaccinations in the past 4 weeks? no    Form Completed by: Kanika Wolf on 8/16/2021 at 8:34 AM EDT  Form Reviewed by: Lin Lewis CMA ([address] [place] [address]) on 8/16/2021 at 8:34 AM EDT    Did you bring your immunization card with you?  No

## 2021-08-18 ENCOUNTER — HOSPITAL ENCOUNTER (OUTPATIENT)
Dept: MAMMOGRAPHY | Age: 66
Discharge: HOME | End: 2021-08-18
Payer: MEDICARE

## 2021-08-18 DIAGNOSIS — Z12.31 BREAST CANCER SCREENING BY MAMMOGRAM: ICD-10-CM

## 2021-08-18 PROCEDURE — 77063 BREAST TOMOSYNTHESIS BI: CPT

## 2021-08-27 ENCOUNTER — HOSPITAL ENCOUNTER (OUTPATIENT)
Age: 66
Discharge: HOME | End: 2021-08-27
Payer: MEDICARE

## 2021-08-27 DIAGNOSIS — E03.8 OTHER SPECIFIED HYPOTHYROIDISM: ICD-10-CM

## 2021-08-27 DIAGNOSIS — Z13.1 SCREENING FOR DIABETES MELLITUS: ICD-10-CM

## 2021-08-27 LAB
ANION GAP SERPL CALCULATED.3IONS-SCNC: 9 MEQ/L (ref 8–16)
BUN BLDV-MCNC: 17 MG/DL (ref 7–22)
CALCIUM SERPL-MCNC: 9.5 MG/DL (ref 8.5–10.5)
CHLORIDE BLD-SCNC: 101 MEQ/L (ref 98–111)
CO2: 28 MEQ/L (ref 23–33)
CREAT SERPL-MCNC: 0.7 MG/DL (ref 0.4–1.2)
GFR SERPL CREATININE-BSD FRML MDRD: 84 ML/MIN/1.73M2
GLUCOSE BLD-MCNC: 111 MG/DL (ref 70–108)
POTASSIUM SERPL-SCNC: 4.2 MEQ/L (ref 3.5–5.2)
SODIUM BLD-SCNC: 138 MEQ/L (ref 135–145)
T4 FREE: 1.52 NG/DL (ref 0.93–1.76)
TSH SERPL DL<=0.05 MIU/L-ACNC: 7.74 UIU/ML (ref 0.4–4.2)

## 2021-08-27 PROCEDURE — 36415 COLL VENOUS BLD VENIPUNCTURE: CPT

## 2021-08-27 PROCEDURE — 80048 BASIC METABOLIC PNL TOTAL CA: CPT

## 2021-08-27 PROCEDURE — 84439 ASSAY OF FREE THYROXINE: CPT

## 2021-08-27 PROCEDURE — 84443 ASSAY THYROID STIM HORMONE: CPT

## 2021-09-19 ENCOUNTER — TELEPHONE (OUTPATIENT)
Dept: FAMILY MEDICINE CLINIC | Age: 66
End: 2021-09-19

## 2021-09-19 DIAGNOSIS — R73.09 ELEVATED GLUCOSE: Primary | ICD-10-CM

## 2021-09-19 NOTE — TELEPHONE ENCOUNTER
T4 is in normal range with mildly elevated TSH. Continue same dose of synthroid. BMP has mildly elevated glucose.  Recommend checking a1c to rule out diabetes and pre-diabetes.  Let me know if you want me to order the a1c test.    Please advise patient.   Rey Moralez MD

## 2021-10-25 NOTE — TELEPHONE ENCOUNTER
Pt informed. She is willing to have A1C checked. She will go to [facility] to have this done.

## 2021-11-09 ENCOUNTER — HOSPITAL ENCOUNTER (OUTPATIENT)
Age: 66
Discharge: HOME | End: 2021-11-09
Payer: MEDICARE

## 2021-11-09 DIAGNOSIS — R73.09 ELEVATED GLUCOSE: ICD-10-CM

## 2021-11-09 LAB
AVERAGE GLUCOSE: 123 MG/DL (ref 70–126)
HBA1C MFR BLD: 6.1 % (ref 4.4–6.4)

## 2021-11-09 PROCEDURE — 36415 COLL VENOUS BLD VENIPUNCTURE: CPT

## 2021-11-09 PROCEDURE — 83036 HEMOGLOBIN GLYCOSYLATED A1C: CPT

## 2021-11-10 PROBLEM — R73.09 ELEVATED HEMOGLOBIN A1C: Status: ACTIVE | Noted: 2021-11-10

## 2021-12-21 ENCOUNTER — NURSE ONLY (OUTPATIENT)
Dept: FAMILY MEDICINE CLINIC | Age: 66
End: 2021-12-21

## 2021-12-21 DIAGNOSIS — Z20.822 ENCOUNTER FOR LABORATORY TESTING FOR COVID-19 VIRUS: Primary | ICD-10-CM

## 2021-12-21 LAB
Lab: ABNORMAL
QC PASS/FAIL: ABNORMAL
SARS-COV-2 RDRP RESP QL NAA+PROBE: POSITIVE

## 2021-12-21 PROCEDURE — 87635 SARS-COV-2 COVID-19 AMP PRB: CPT | Performed by: FAMILY MEDICINE

## 2021-12-21 NOTE — PROGRESS NOTES
Shanta Li arrived at our office for a drive-up [id number] test swab to be obtained. Specimen was collected with no complications and processed in this clinic's lab. Results were Positive for COVID-19. Patient advised of results and given paper copy.

## 2022-01-17 DIAGNOSIS — E03.8 OTHER SPECIFIED HYPOTHYROIDISM: ICD-10-CM

## 2022-01-19 RX ORDER — LEVOTHYROXINE SODIUM 0.1 MG/1
TABLET ORAL
Qty: 90 TABLET | Refills: 1 | Status: SHIPPED | OUTPATIENT
Start: 2022-01-19 | End: 2022-08-22 | Stop reason: SDUPTHER

## 2022-01-19 NOTE — TELEPHONE ENCOUNTER
Kobi Martinez is requesting a refill on the following medications:  Requested Prescriptions     Pending Prescriptions Disp Refills    levothyroxine (SYNTHROID) 100 MCG tablet [Pharmacy Med Name: LEVOTHYROXIN TAB 100MCG] 90 tablet 1     Sig: TAKE 1 TABLET DAILY       Date of last visit: 8/16/2021  Date of next visit (if applicable):8/22/2022  Date of last refill: 8/16/2021  Pharmacy Name: Yvonne Marx MA

## 2022-06-30 ENCOUNTER — PATIENT MESSAGE (OUTPATIENT)
Dept: FAMILY MEDICINE CLINIC | Age: 67
End: 2022-06-30

## 2022-06-30 NOTE — TELEPHONE ENCOUNTER
From: Donis Husain  To: Dr. Durand Taco: 6/30/2022 12:54 PM EDT  Subject: 2nd Shingles Vaccine Received    On June 23, 2022, I received the 2nd Shingles vaccine at the [facility] in [facility]. What needs to be done to have this added to my Immunizations list?    Thanks!

## 2022-07-20 ENCOUNTER — TELEPHONE (OUTPATIENT)
Dept: FAMILY MEDICINE CLINIC | Age: 67
End: 2022-07-20

## 2022-07-20 DIAGNOSIS — R73.09 ELEVATED GLUCOSE: Primary | ICD-10-CM

## 2022-07-20 DIAGNOSIS — Z00.00 ROUTINE GENERAL MEDICAL EXAMINATION AT A HEALTH CARE FACILITY: ICD-10-CM

## 2022-07-20 DIAGNOSIS — E03.8 OTHER SPECIFIED HYPOTHYROIDISM: ICD-10-CM

## 2022-07-20 NOTE — TELEPHONE ENCOUNTER
----- Message from Camille Gu sent at 7/20/2022 10:51 AM EDT -----  Subject: Message to Provider    QUESTIONS  Information for Provider? Patient is requesting lab orders for upcoming   AWV 08-  ---------------------------------------------------------------------------  --------------  [facility] INFO  [phone]; OK to leave message on voicemail  ---------------------------------------------------------------------------  --------------  SCRIPT ANSWERS  Relationship to Patient?  Self

## 2022-08-15 ENCOUNTER — TELEPHONE (OUTPATIENT)
Dept: SURGERY | Age: 67
End: 2022-08-15

## 2022-08-15 NOTE — TELEPHONE ENCOUNTER
LM for pt to call and schedule appointment with Dr Prince Osullivan for 5 year colonoscopy recall. Please schedule as new patient.

## 2022-08-17 ENCOUNTER — HOSPITAL ENCOUNTER (OUTPATIENT)
Age: 67
Discharge: HOME | End: 2022-08-17
Payer: MEDICARE

## 2022-08-17 DIAGNOSIS — E03.8 OTHER SPECIFIED HYPOTHYROIDISM: ICD-10-CM

## 2022-08-17 DIAGNOSIS — R73.09 ELEVATED GLUCOSE: ICD-10-CM

## 2022-08-17 DIAGNOSIS — Z00.00 ROUTINE GENERAL MEDICAL EXAMINATION AT A HEALTH CARE FACILITY: ICD-10-CM

## 2022-08-17 LAB
ALBUMIN SERPL-MCNC: 4.6 G/DL (ref 3.5–5.1)
ALP BLD-CCNC: 75 U/L (ref 38–126)
ALT SERPL-CCNC: 16 U/L (ref 11–66)
ANION GAP SERPL CALCULATED.3IONS-SCNC: 13 MEQ/L (ref 8–16)
AST SERPL-CCNC: 19 U/L (ref 5–40)
AVERAGE GLUCOSE: 108 MG/DL (ref 70–126)
BILIRUB SERPL-MCNC: 0.4 MG/DL (ref 0.3–1.2)
BUN BLDV-MCNC: 17 MG/DL (ref 7–22)
CALCIUM SERPL-MCNC: 9.9 MG/DL (ref 8.5–10.5)
CHLORIDE BLD-SCNC: 103 MEQ/L (ref 98–111)
CHOLESTEROL, FASTING: 241 MG/DL (ref 100–199)
CO2: 26 MEQ/L (ref 23–33)
CREAT SERPL-MCNC: 0.7 MG/DL (ref 0.4–1.2)
GFR SERPL CREATININE-BSD FRML MDRD: 84 ML/MIN/1.73M2
GLUCOSE BLD-MCNC: 103 MG/DL (ref 70–108)
HBA1C MFR BLD: 5.6 % (ref 4.4–6.4)
HDLC SERPL-MCNC: 63 MG/DL
LDL CHOLESTEROL CALCULATED: 165 MG/DL
POTASSIUM SERPL-SCNC: 4.7 MEQ/L (ref 3.5–5.2)
SODIUM BLD-SCNC: 142 MEQ/L (ref 135–145)
T4 FREE: 1.45 NG/DL (ref 0.93–1.76)
TOTAL PROTEIN: 6.9 G/DL (ref 6.1–8)
TRIGLYCERIDE, FASTING: 66 MG/DL (ref 0–199)
TSH SERPL DL<=0.05 MIU/L-ACNC: 3.32 UIU/ML (ref 0.4–4.2)

## 2022-08-17 PROCEDURE — 84439 ASSAY OF FREE THYROXINE: CPT

## 2022-08-17 PROCEDURE — 83036 HEMOGLOBIN GLYCOSYLATED A1C: CPT

## 2022-08-17 PROCEDURE — 84443 ASSAY THYROID STIM HORMONE: CPT

## 2022-08-17 PROCEDURE — 80053 COMPREHEN METABOLIC PANEL: CPT

## 2022-08-17 PROCEDURE — 80061 LIPID PANEL: CPT

## 2022-08-17 PROCEDURE — 36415 COLL VENOUS BLD VENIPUNCTURE: CPT

## 2022-08-18 PROBLEM — E78.49 OTHER HYPERLIPIDEMIA: Status: ACTIVE | Noted: 2022-08-18

## 2022-08-19 ENCOUNTER — HOSPITAL ENCOUNTER (OUTPATIENT)
Dept: MAMMOGRAPHY | Age: 67
Discharge: HOME | End: 2022-08-19
Payer: MEDICARE

## 2022-08-19 DIAGNOSIS — Z12.31 ENCOUNTER FOR SCREENING MAMMOGRAM FOR MALIGNANT NEOPLASM OF BREAST: ICD-10-CM

## 2022-08-19 PROCEDURE — 77063 BREAST TOMOSYNTHESIS BI: CPT

## 2022-08-22 ENCOUNTER — OFFICE VISIT (OUTPATIENT)
Dept: FAMILY MEDICINE CLINIC | Age: 67
End: 2022-08-22
Payer: MEDICARE

## 2022-08-22 VITALS
HEART RATE: 71 BPM | OXYGEN SATURATION: 98 % | WEIGHT: 152.4 LBS | SYSTOLIC BLOOD PRESSURE: 130 MMHG | TEMPERATURE: 97.3 F | HEIGHT: 64 IN | RESPIRATION RATE: 14 BRPM | DIASTOLIC BLOOD PRESSURE: 72 MMHG | BODY MASS INDEX: 26.02 KG/M2

## 2022-08-22 DIAGNOSIS — E78.49 OTHER HYPERLIPIDEMIA: ICD-10-CM

## 2022-08-22 DIAGNOSIS — E03.8 OTHER SPECIFIED HYPOTHYROIDISM: ICD-10-CM

## 2022-08-22 DIAGNOSIS — Z00.00 MEDICARE ANNUAL WELLNESS VISIT, SUBSEQUENT: Primary | ICD-10-CM

## 2022-08-22 PROCEDURE — 3017F COLORECTAL CA SCREEN DOC REV: CPT | Performed by: FAMILY MEDICINE

## 2022-08-22 PROCEDURE — G0439 PPPS, SUBSEQ VISIT: HCPCS | Performed by: FAMILY MEDICINE

## 2022-08-22 PROCEDURE — 1123F ACP DISCUSS/DSCN MKR DOCD: CPT | Performed by: FAMILY MEDICINE

## 2022-08-22 RX ORDER — LEVOTHYROXINE SODIUM 0.1 MG/1
TABLET ORAL
Qty: 90 TABLET | Refills: 3 | Status: SHIPPED | OUTPATIENT
Start: 2022-08-22

## 2022-08-22 RX ORDER — LEVOTHYROXINE SODIUM 0.1 MG/1
TABLET ORAL
Qty: 90 TABLET | Refills: 3 | Status: SHIPPED | OUTPATIENT
Start: 2022-08-22 | End: 2022-08-22 | Stop reason: SDUPTHER

## 2022-08-22 SDOH — ECONOMIC STABILITY: FOOD INSECURITY: WITHIN THE PAST 12 MONTHS, THE FOOD YOU BOUGHT JUST DIDN'T LAST AND YOU DIDN'T HAVE MONEY TO GET MORE.: NEVER TRUE

## 2022-08-22 SDOH — ECONOMIC STABILITY: FOOD INSECURITY: WITHIN THE PAST 12 MONTHS, YOU WORRIED THAT YOUR FOOD WOULD RUN OUT BEFORE YOU GOT MONEY TO BUY MORE.: NEVER TRUE

## 2022-08-22 ASSESSMENT — PATIENT HEALTH QUESTIONNAIRE - PHQ9
2. FEELING DOWN, DEPRESSED OR HOPELESS: 0
SUM OF ALL RESPONSES TO PHQ QUESTIONS 1-9: 0
SUM OF ALL RESPONSES TO PHQ QUESTIONS 1-9: 0
1. LITTLE INTEREST OR PLEASURE IN DOING THINGS: 0
SUM OF ALL RESPONSES TO PHQ QUESTIONS 1-9: 0
SUM OF ALL RESPONSES TO PHQ9 QUESTIONS 1 & 2: 0
SUM OF ALL RESPONSES TO PHQ QUESTIONS 1-9: 0

## 2022-08-22 ASSESSMENT — SOCIAL DETERMINANTS OF HEALTH (SDOH): HOW HARD IS IT FOR YOU TO PAY FOR THE VERY BASICS LIKE FOOD, HOUSING, MEDICAL CARE, AND HEATING?: NOT HARD AT ALL

## 2022-08-22 ASSESSMENT — LIFESTYLE VARIABLES
HOW OFTEN DO YOU HAVE A DRINK CONTAINING ALCOHOL: MONTHLY OR LESS
HOW MANY STANDARD DRINKS CONTAINING ALCOHOL DO YOU HAVE ON A TYPICAL DAY: 1 OR 2

## 2022-08-22 NOTE — PROGRESS NOTES
Medicare Annual Wellness Visit    Diane Case is here for Medicare AWV and Health Maintenance (Has colonoscopy consult scheduled soon)    Assessment & Plan   Medicare annual wellness visit, subsequent  Other specified hypothyroidism  -     levothyroxine (SYNTHROID) 100 MCG tablet; TAKE 1 TABLET DAILY, Disp-90 tablet, R-3Normal  Other hyperlipidemia        Chronic hypothyroidism. Controlled. Refill Synthroid  Hyperlipidemia. Moderately elevated ASCVD 10-year risk score. Discussed benefits and risks of statin. She declines statin at this time    Has appt in sept for scheduling colonoscopy. Declines hep C testing  Recommend covid vaccine booster    Recommendations for Preventive Services Due: see orders and patient instructions/AVS.  Recommended screening schedule for the next 5-10 years is provided to the patient in written form: see Patient Instructions/AVS.     Return in about 1 year (around 8/23/2023) for medicare wellness. Subjective       Patient's complete Health Risk Assessment and screening values have been reviewed and are found in Flowsheets. The following problems were reviewed today and where indicated follow up appointments were made and/or referrals ordered.     Positive Risk Factor Screenings with Interventions:             General Health and ACP:  General  In general, how would you say your health is?: Very Good  In the past 7 days, have you experienced any of the following: New or Increased Pain, New or Increased Fatigue, Loneliness, Social Isolation, Stress or Anger?: No  Do you get the social and emotional support that you need?: Yes  Do you have a Living Will?: Yes    Advance Directives       Power of  Living Will ACP-Advance Directive ACP-Power of     Not on File Not on File Not on File Not on File        General Health Risk Interventions:  N/a              Objective   Vitals:    08/22/22 0803   BP: 130/72   Site: Left Upper Arm   Position: Sitting   Pulse: 71   Resp: No      Systolic Blood Pressure: 186 mmHg      Is BP treated: No      HDL Cholesterol: 63 mg/dL      Total Cholesterol: 241 mg/dl

## 2022-08-22 NOTE — PATIENT INSTRUCTIONS
Personalized Preventive Plan for Ghanshyam Sinha - 8/22/2022  Medicare offers a range of preventive health benefits. Some of the tests and screenings are paid in full while other may be subject to a deductible, co-insurance, and/or copay. Some of these benefits include a comprehensive review of your medical history including lifestyle, illnesses that may run in your family, and various assessments and screenings as appropriate. After reviewing your medical record and screening and assessments performed today your provider may have ordered immunizations, labs, imaging, and/or referrals for you. A list of these orders (if applicable) as well as your Preventive Care list are included within your After Visit Summary for your review. Other Preventive Recommendations:    A preventive eye exam performed by an eye specialist is recommended every 1-2 years to screen for glaucoma; cataracts, macular degeneration, and other eye disorders. A preventive dental visit is recommended every 6 months. Try to get at least 150 minutes of exercise per week or 10,000 steps per day on a pedometer . Order or download the FREE \"Exercise & Physical Activity: Your Everyday Guide\" from [employer]. Call 7-159.623.9089 or search [employer] online. You need 8936-1585 mg of calcium and 9866-2228 IU of vitamin D per day. It is possible to meet your calcium requirement with diet alone, but a vitamin D supplement is usually necessary to meet this goal.  When exposed to the sun, use a sunscreen that protects against both UVA and UVB radiation with an SPF of 30 or greater. Reapply every 2 to 3 hours or after sweating, drying off with a towel, or swimming. Always wear a seat belt when traveling in a car. Always wear a helmet when riding a bicycle or motorcycle.

## 2022-09-13 ENCOUNTER — TELEPHONE (OUTPATIENT)
Dept: SURGERY | Age: 67
End: 2022-09-13

## 2022-09-13 ENCOUNTER — OFFICE VISIT (OUTPATIENT)
Dept: SURGERY | Age: 67
End: 2022-09-13

## 2022-09-13 ENCOUNTER — PREP FOR PROCEDURE (OUTPATIENT)
Dept: SURGERY | Age: 67
End: 2022-09-13

## 2022-09-13 VITALS
WEIGHT: 156 LBS | SYSTOLIC BLOOD PRESSURE: 120 MMHG | RESPIRATION RATE: 18 BRPM | TEMPERATURE: 96.4 F | BODY MASS INDEX: 26.63 KG/M2 | HEART RATE: 64 BPM | OXYGEN SATURATION: 98 % | HEIGHT: 64 IN | DIASTOLIC BLOOD PRESSURE: 50 MMHG

## 2022-09-13 DIAGNOSIS — Z12.11 ENCOUNTER FOR SCREENING COLONOSCOPY: Primary | ICD-10-CM

## 2022-09-13 PROCEDURE — G8399 PT W/DXA RESULTS DOCUMENT: HCPCS | Performed by: SURGERY

## 2022-09-13 PROCEDURE — 3017F COLORECTAL CA SCREEN DOC REV: CPT | Performed by: SURGERY

## 2022-09-13 PROCEDURE — 1123F ACP DISCUSS/DSCN MKR DOCD: CPT | Performed by: SURGERY

## 2022-09-13 PROCEDURE — 99999 PR OFFICE/OUTPT VISIT,PROCEDURE ONLY: CPT | Performed by: SURGERY

## 2022-09-13 PROCEDURE — G8427 DOCREV CUR MEDS BY ELIG CLIN: HCPCS | Performed by: SURGERY

## 2022-09-13 PROCEDURE — 1090F PRES/ABSN URINE INCON ASSESS: CPT | Performed by: SURGERY

## 2022-09-13 PROCEDURE — 1036F TOBACCO NON-USER: CPT | Performed by: SURGERY

## 2022-09-13 PROCEDURE — G8417 CALC BMI ABV UP PARAM F/U: HCPCS | Performed by: SURGERY

## 2022-09-13 RX ORDER — SODIUM CHLORIDE 450 MG/100ML
INJECTION, SOLUTION INTRAVENOUS CONTINUOUS
Status: CANCELLED | OUTPATIENT
Start: 2022-09-13

## 2022-09-13 ASSESSMENT — ENCOUNTER SYMPTOMS
COLOR CHANGE: 0
SINUS PAIN: 0
TROUBLE SWALLOWING: 0
SINUS PRESSURE: 0
EYES NEGATIVE: 1
BACK PAIN: 0
RESPIRATORY NEGATIVE: 1
CHEST TIGHTNESS: 0
STRIDOR: 0
COUGH: 0
GASTROINTESTINAL NEGATIVE: 1
APNEA: 0
EYE PAIN: 0
SHORTNESS OF BREATH: 0
EYE ITCHING: 0
RHINORRHEA: 0
ALLERGIC/IMMUNOLOGIC NEGATIVE: 1
EYE REDNESS: 0
SORE THROAT: 0
CHOKING: 0
EYE DISCHARGE: 0
FACIAL SWELLING: 0
VOICE CHANGE: 0
PHOTOPHOBIA: 0
WHEEZING: 0

## 2022-09-13 NOTE — TELEPHONE ENCOUNTER
Patient scheduled with Dr. Zach Barajas for a colonoscopy with or without biopsy with anesthesia at [address] [place] [address] on 09- at 7:30am with an arrival time of 6:30am.  Colonoscopy bowel prep given and reviewed. Consent signed.

## 2022-09-13 NOTE — PROGRESS NOTES
[address] [address]  Dept: [phone]  Dept Fax: [phone]  Loc: [phone]    Visit Date: 9/13/2022    Darion Goins is a 78 y.o. female who presentstoday for:  Chief Complaint   Patient presents with    Surgical Consult     Est patient- last seen in 2017-Screening colonoscopy       HPI:     HPI 79-year-old female who I had performed colonoscopy 5 years ago who sister had colon cancer. Unfortunately her sister did die from metastatic colon cancer. Patient has had no blood in her stool no change in bowel habits no weight loss denies any anal prolapse and has no pain with her bowel movements. Patient is semiretired but spends a lot of time taking care of her grandkids she is here to discuss colonoscopy    Past Medical History:   Diagnosis Date    Thyroid disease     hypothyroidism      Past Surgical History:   Procedure Laterality Date    CHOLECYSTECTOMY  2005? Dr. Job Mccord     COLONOSCOPY  07/18/2017    Dr. Kareem Rendon  [address] IND Left 7/18/2017    ENDOSCOPY COLONOSCOPY SCREENING performed by Andie Hayes MD at [facility]        Family History   Problem Relation Age of Onset    Heart Disease Father     Diabetes Father     Heart Disease Sister     Cancer Sister         colon    Heart Disease Maternal Grandfather     Stroke Paternal Grandfather         Social History     Tobacco Use    Smoking status: Never    Smokeless tobacco: Never   Substance Use Topics    Alcohol use:  Yes     Alcohol/week: 2.0 standard drinks     Types: 2 Cans of beer per week     Comment: rare          Current Outpatient Medications   Medication Sig Dispense Refill    levothyroxine (SYNTHROID) 100 MCG tablet TAKE 1 TABLET DAILY 90 tablet 3    calcium carbonate (OSCAL) 500 MG TABS tablet Take 500 mg by mouth daily      Turmeric 400 MG CAPS Take by mouth Ascorbic Acid (VITAMIN C) 500 MG tablet Take 500 mg by mouth daily      Cyanocobalamin (VITAMIN B 12 PO) Take by mouth      Omega-3 Fatty Acids (FISH OIL) 1000 MG CAPS Take 3,000 mg by mouth 3 times daily       No current facility-administered medications for this visit. No Known Allergies    Subjective:      Review of Systems   Constitutional: Negative. Negative for activity change, appetite change, chills, diaphoresis, fatigue, fever and unexpected weight change. HENT: Negative. Negative for congestion, dental problem, drooling, ear discharge, ear pain, facial swelling, hearing loss, mouth sores, nosebleeds, postnasal drip, rhinorrhea, sinus pressure, sinus pain, sneezing, sore throat, tinnitus, trouble swallowing and voice change. Eyes: Negative. Negative for photophobia, pain, discharge, redness, itching and visual disturbance. Respiratory: Negative. Negative for apnea, cough, choking, chest tightness, shortness of breath, wheezing and stridor. Cardiovascular: Negative. Negative for chest pain, palpitations and leg swelling. Gastrointestinal: Negative. Endocrine: Negative. Negative for cold intolerance, heat intolerance, polydipsia, polyphagia and polyuria. Genitourinary: Negative. Negative for decreased urine volume, difficulty urinating, dyspareunia, dysuria, enuresis, flank pain, frequency, genital sores, hematuria, menstrual problem, pelvic pain, urgency, vaginal bleeding, vaginal discharge and vaginal pain. Musculoskeletal: Negative. Negative for arthralgias, back pain, gait problem, joint swelling, myalgias, neck pain and neck stiffness. Skin:  Negative for color change, pallor, rash and wound. Allergic/Immunologic: Negative. Negative for environmental allergies, food allergies and immunocompromised state. Neurological: Negative.   Negative for dizziness, tremors, seizures, syncope, facial asymmetry, speech difficulty, weakness, light-headedness, numbness and headaches. Hematological: Negative. Negative for adenopathy. Does not bruise/bleed easily. Psychiatric/Behavioral: Negative. Negative for agitation, behavioral problems, confusion, decreased concentration, dysphoric mood, hallucinations, self-injury, sleep disturbance and suicidal ideas. The patient is not nervous/anxious and is not hyperactive. Objective:   BP (!) 120/50 (Site: Right Upper Arm, Position: Sitting, Cuff Size: Medium Adult)   Pulse 64   Temp (!) 96.4 °F (35.8 °C) (Temporal)   Resp 18   Ht 5' 4\" (1.626 m)   Wt 156 lb (70.8 kg)   SpO2 98%   BMI 26.78 kg/m²     Physical Exam  Constitutional:       Appearance: She is well-developed. HENT:      Head: Normocephalic and atraumatic. Eyes:      General: No scleral icterus. Right eye: No discharge. Left eye: No discharge. Conjunctiva/sclera: Conjunctivae normal.      Pupils: Pupils are equal, round, and reactive to light. Neck:      Thyroid: No thyromegaly. Vascular: No JVD. Cardiovascular:      Rate and Rhythm: Normal rate and regular rhythm. Heart sounds: No murmur heard. No friction rub. No gallop. Pulmonary:      Effort: Pulmonary effort is normal. No respiratory distress. Breath sounds: Normal breath sounds. No stridor. No wheezing. Chest:      Chest wall: No tenderness. Abdominal:      General: There is no distension. Palpations: There is no mass. Tenderness: There is no abdominal tenderness. There is no left CVA tenderness or rebound. Hernia: No hernia is present. Musculoskeletal:         General: Normal range of motion. Cervical back: Normal range of motion and neck supple. Skin:     General: Skin is warm and dry. Coloration: Skin is not pale. Findings: No erythema or rash. Neurological:      Mental Status: She is alert and oriented to person, place, and time. Psychiatric:         Behavior: Behavior normal.         Thought Content:  Thought content normal.         Judgment: Judgment normal.          Results for orders placed or performed during the hospital encounter of 08/17/22   TSH   Result Value Ref Range    TSH 3.320 0.400 - 4.200 uIU/mL   Hemoglobin A1C   Result Value Ref Range    Hemoglobin A1C 5.6 4.4 - 6.4 %    AVERAGE GLUCOSE 108 70 - 126 mg/dL   T4, Free   Result Value Ref Range    T4 Free 1.45 0.93 - 1.76 ng/dL   Lipid, Fasting   Result Value Ref Range    Cholesterol, Fasting 241 (H) 100 - 199 mg/dl    Triglyceride, Fasting 66 0 - 199 mg/dl    HDL 63 mg/dL    LDL Calculated 165 mg/dL   Comprehensive Metabolic Panel   Result Value Ref Range    Glucose 103 70 - 108 mg/dL    Creatinine 0.7 0.4 - 1.2 mg/dL    BUN 17 7 - 22 mg/dL    Sodium 142 135 - 145 meq/L    Potassium 4.7 3.5 - 5.2 meq/L    Chloride 103 98 - 111 meq/L    CO2 26 23 - 33 meq/L    Calcium 9.9 8.5 - 10.5 mg/dL    AST 19 5 - 40 U/L    Alkaline Phosphatase 75 38 - 126 U/L    Total Protein 6.9 6.1 - 8.0 g/dL    Albumin 4.6 3.5 - 5.1 g/dL    Total Bilirubin 0.4 0.3 - 1.2 mg/dL    ALT 16 11 - 66 U/L   Anion Gap   Result Value Ref Range    Anion Gap 13.0 8.0 - 16.0 meq/L   Glomerular Filtration Rate, Estimated   Result Value Ref Range    Est, Glom Filt Rate 84 (A) ml/min/1.73m2       Assessment:     Family history of colon carcinoma in her sister who is in need of screening colonoscopy. We did discuss the Cologuard testing and its 9% false negative rate and the fact its not recommended for people that are at high risk which is her case. We discussed the preparation the sedation the procedure itself and the risk of perforation and need for surgical correction he voices understanding would like to proceed    Plan:     1. Schedule Charlene Christina for colonoscopy  2. The risks, benefits and alternatives were discussed with Charlene Christina. She understands and wishes to proceed with surgical intervention. 3. Restrictions discussed with Charlene Christina and she expresses understanding.   4. She is advised to call back directly if there are further questions/concerns, or if her symptoms worsen prior to surgery.           Electronicallysigned by Sully Chawla MD on 9/13/2022 at 9:00 AM

## 2022-09-26 ENCOUNTER — TELEPHONE (OUTPATIENT)
Dept: SURGERY | Age: 67
End: 2022-09-26

## 2022-09-26 NOTE — TELEPHONE ENCOUNTER
Patient notified of new Colonoscopy arrival time. Patient is to be at [facility] by  8:00am   on  09-    Patient reminded to have nothing to eat or drink after midnight. Patient voiced understanding.

## 2022-09-28 ENCOUNTER — HOSPITAL ENCOUNTER (OUTPATIENT)
Age: 67
Setting detail: OUTPATIENT SURGERY
Discharge: HOME | End: 2022-09-28
Attending: SURGERY | Admitting: SURGERY
Payer: MEDICARE

## 2022-09-28 ENCOUNTER — ANESTHESIA EVENT (OUTPATIENT)
Dept: ENDOSCOPY | Age: 67
End: 2022-09-28
Payer: MEDICARE

## 2022-09-28 ENCOUNTER — ANESTHESIA (OUTPATIENT)
Dept: ENDOSCOPY | Age: 67
End: 2022-09-28
Payer: MEDICARE

## 2022-09-28 VITALS
WEIGHT: 149.6 LBS | TEMPERATURE: 96.8 F | DIASTOLIC BLOOD PRESSURE: 74 MMHG | SYSTOLIC BLOOD PRESSURE: 108 MMHG | RESPIRATION RATE: 17 BRPM | BODY MASS INDEX: 25.54 KG/M2 | HEIGHT: 64 IN | OXYGEN SATURATION: 97 % | HEART RATE: 79 BPM

## 2022-09-28 PROBLEM — Z80.0 FAMILY HISTORY OF COLON CANCER: Status: ACTIVE | Noted: 2022-09-28

## 2022-09-28 PROCEDURE — 2580000003 HC RX 258

## 2022-09-28 PROCEDURE — 2500000003 HC RX 250 WO HCPCS: Performed by: REGISTERED NURSE

## 2022-09-28 PROCEDURE — 6360000002 HC RX W HCPCS: Performed by: REGISTERED NURSE

## 2022-09-28 PROCEDURE — 7100000011 HC PHASE II RECOVERY - ADDTL 15 MIN: Performed by: SURGERY

## 2022-09-28 PROCEDURE — 3700000001 HC ADD 15 MINUTES (ANESTHESIA): Performed by: SURGERY

## 2022-09-28 PROCEDURE — G0105 COLORECTAL SCRN; HI RISK IND: HCPCS | Performed by: SURGERY

## 2022-09-28 PROCEDURE — 3700000000 HC ANESTHESIA ATTENDED CARE: Performed by: SURGERY

## 2022-09-28 PROCEDURE — 3609027000 HC COLONOSCOPY: Performed by: SURGERY

## 2022-09-28 PROCEDURE — 7100000010 HC PHASE II RECOVERY - FIRST 15 MIN: Performed by: SURGERY

## 2022-09-28 PROCEDURE — 2709999900 HC NON-CHARGEABLE SUPPLY: Performed by: SURGERY

## 2022-09-28 RX ORDER — PROPOFOL 10 MG/ML
INJECTION, EMULSION INTRAVENOUS PRN
Status: DISCONTINUED | OUTPATIENT
Start: 2022-09-28 | End: 2022-09-28 | Stop reason: SDUPTHER

## 2022-09-28 RX ORDER — GLYCOPYRROLATE 1 MG/5 ML
SYRINGE (ML) INTRAVENOUS PRN
Status: DISCONTINUED | OUTPATIENT
Start: 2022-09-28 | End: 2022-09-28 | Stop reason: SDUPTHER

## 2022-09-28 RX ORDER — SODIUM CHLORIDE 450 MG/100ML
INJECTION, SOLUTION INTRAVENOUS CONTINUOUS
Status: DISCONTINUED | OUTPATIENT
Start: 2022-09-28 | End: 2022-09-28 | Stop reason: HOSPADM

## 2022-09-28 RX ADMIN — PROPOFOL 50 MG: 10 INJECTION, EMULSION INTRAVENOUS at 09:21

## 2022-09-28 RX ADMIN — PROPOFOL 50 MG: 10 INJECTION, EMULSION INTRAVENOUS at 09:33

## 2022-09-28 RX ADMIN — PROPOFOL 50 MG: 10 INJECTION, EMULSION INTRAVENOUS at 09:37

## 2022-09-28 RX ADMIN — PROPOFOL 50 MG: 10 INJECTION, EMULSION INTRAVENOUS at 09:27

## 2022-09-28 RX ADMIN — SODIUM CHLORIDE: 4.5 INJECTION, SOLUTION INTRAVENOUS at 08:29

## 2022-09-28 RX ADMIN — Medication 0.4 MG: at 09:20

## 2022-09-28 RX ADMIN — PROPOFOL 50 MG: 10 INJECTION, EMULSION INTRAVENOUS at 09:42

## 2022-09-28 RX ADMIN — PROPOFOL 50 MG: 10 INJECTION, EMULSION INTRAVENOUS at 09:24

## 2022-09-28 RX ADMIN — PROPOFOL 50 MG: 10 INJECTION, EMULSION INTRAVENOUS at 09:12

## 2022-09-28 RX ADMIN — PROPOFOL 50 MG: 10 INJECTION, EMULSION INTRAVENOUS at 09:30

## 2022-09-28 RX ADMIN — PROPOFOL 20 MG: 10 INJECTION, EMULSION INTRAVENOUS at 09:17

## 2022-09-28 RX ADMIN — PROPOFOL 50 MG: 10 INJECTION, EMULSION INTRAVENOUS at 09:40

## 2022-09-28 ASSESSMENT — PAIN - FUNCTIONAL ASSESSMENT: PAIN_FUNCTIONAL_ASSESSMENT: NONE - DENIES PAIN

## 2022-09-28 ASSESSMENT — PAIN SCALES - GENERAL
PAINLEVEL_OUTOF10: 0
PAINLEVEL_OUTOF10: 0

## 2022-09-28 NOTE — ANESTHESIA POSTPROCEDURE EVALUATION
Department of Anesthesiology  Postprocedure Note    Patient: Josias Fuchs  MRN: [id number]  YOB: 1955  Date of evaluation: 9/28/2022      Procedure Summary     Date: 09/28/22 Room / Location: [address] [place] [address]    Anesthesia Start: 9502 Anesthesia Stop: 0898    Procedure: COLONOSCOPY Diagnosis:       Encounter for screening colonoscopy      (Encounter for screening colonoscopy [Z12.11])    Surgeons: Stefania Perez MD Responsible Provider: Russel Calzada DO    Anesthesia Type: MAC ASA Status: 2          Anesthesia Type: No value filed.     Den Phase I: Den Score: 9    Den Phase II: Den Score: 10      Anesthesia Post Evaluation    Patient location during evaluation: PACU  Patient participation: complete - patient participated  Level of consciousness: awake  Pain score: 0  Airway patency: patent  Nausea & Vomiting: no vomiting and no nausea  Complications: no  Cardiovascular status: hemodynamically stable  Respiratory status: spontaneous ventilation and room air  Hydration status: stable

## 2022-09-28 NOTE — H&P
[address] [place] [address] Dr Cameron Covarrubias [address]  Dept: [phone]  Dept Fax: [phone]  Loc: [phone]    Visit Date: 9/13/2022    Josias Fuchs is a 78 y.o. female who presentstoday for:  Chief Complaint   Patient presents with    Surgical Consult     Est patient- last seen in 2017-Screening colonoscopy       HPI:     HPI 52-year-old female who I had performed colonoscopy 5 years ago who sister had colon cancer. Unfortunately her sister did die from metastatic colon cancer. Patient has had no blood in her stool no change in bowel habits no weight loss denies any anal prolapse and has no pain with her bowel movements. Patient is semiretired but spends a lot of time taking care of her grandkids she is here to discuss colonoscopy    Past Medical History:   Diagnosis Date    Thyroid disease     hypothyroidism      Past Surgical History:   Procedure Laterality Date    CHOLECYSTECTOMY  2005? Dr. Karlee Cardenas     COLONOSCOPY  07/18/2017    Dr. Abdirahman Quick  [facility]    ENDOSCOPY COLONOSCOPY SCREENING performed by Stefania Perez MD at [facility]        Family History   Problem Relation Age of Onset    Heart Disease Father     Diabetes Father     Heart Disease Sister     Cancer Sister         colon    Heart Disease Maternal Grandfather     Stroke Paternal Grandfather         Social History     Tobacco Use    Smoking status: Never    Smokeless tobacco: Never   Substance Use Topics    Alcohol use:  Yes     Alcohol/week: 2.0 standard drinks     Types: 2 Cans of beer per week     Comment: rare          Current Outpatient Medications   Medication Sig Dispense Refill    levothyroxine (SYNTHROID) 100 MCG tablet TAKE 1 TABLET DAILY 90 tablet 3    calcium carbonate (OSCAL) 500 MG TABS tablet Take 500 mg by mouth daily      Turmeric 400 MG CAPS Take by mouth Ascorbic Acid (VITAMIN C) 500 MG tablet Take 500 mg by mouth daily      Cyanocobalamin (VITAMIN B 12 PO) Take by mouth      Omega-3 Fatty Acids (FISH OIL) 1000 MG CAPS Take 3,000 mg by mouth 3 times daily       No current facility-administered medications for this visit. No Known Allergies    Subjective:      Review of Systems   Constitutional: Negative. Negative for activity change, appetite change, chills, diaphoresis, fatigue, fever and unexpected weight change. HENT: Negative. Negative for congestion, dental problem, drooling, ear discharge, ear pain, facial swelling, hearing loss, mouth sores, nosebleeds, postnasal drip, rhinorrhea, sinus pressure, sinus pain, sneezing, sore throat, tinnitus, trouble swallowing and voice change. Eyes: Negative. Negative for photophobia, pain, discharge, redness, itching and visual disturbance. Respiratory: Negative. Negative for apnea, cough, choking, chest tightness, shortness of breath, wheezing and stridor. Cardiovascular: Negative. Negative for chest pain, palpitations and leg swelling. Gastrointestinal: Negative. Endocrine: Negative. Negative for cold intolerance, heat intolerance, polydipsia, polyphagia and polyuria. Genitourinary: Negative. Negative for decreased urine volume, difficulty urinating, dyspareunia, dysuria, enuresis, flank pain, frequency, genital sores, hematuria, menstrual problem, pelvic pain, urgency, vaginal bleeding, vaginal discharge and vaginal pain. Musculoskeletal: Negative. Negative for arthralgias, back pain, gait problem, joint swelling, myalgias, neck pain and neck stiffness. Skin:  Negative for color change, pallor, rash and wound. Allergic/Immunologic: Negative. Negative for environmental allergies, food allergies and immunocompromised state. Neurological: Negative.   Negative for dizziness, tremors, seizures, syncope, facial asymmetry, speech difficulty, weakness, light-headedness, numbness and headaches. Hematological: Negative. Negative for adenopathy. Does not bruise/bleed easily. Psychiatric/Behavioral: Negative. Negative for agitation, behavioral problems, confusion, decreased concentration, dysphoric mood, hallucinations, self-injury, sleep disturbance and suicidal ideas. The patient is not nervous/anxious and is not hyperactive. Objective:   BP (!) 120/50 (Site: Right Upper Arm, Position: Sitting, Cuff Size: Medium Adult)   Pulse 64   Temp (!) 96.4 °F (35.8 °C) (Temporal)   Resp 18   Ht 5' 4\" (1.626 m)   Wt 156 lb (70.8 kg)   SpO2 98%   BMI 26.78 kg/m²     Physical Exam  Constitutional:       Appearance: She is well-developed. HENT:      Head: Normocephalic and atraumatic. Eyes:      General: No scleral icterus. Right eye: No discharge. Left eye: No discharge. Conjunctiva/sclera: Conjunctivae normal.      Pupils: Pupils are equal, round, and reactive to light. Neck:      Thyroid: No thyromegaly. Vascular: No JVD. Cardiovascular:      Rate and Rhythm: Normal rate and regular rhythm. Heart sounds: No murmur heard. No friction rub. No gallop. Pulmonary:      Effort: Pulmonary effort is normal. No respiratory distress. Breath sounds: Normal breath sounds. No stridor. No wheezing. Chest:      Chest wall: No tenderness. Abdominal:      General: There is no distension. Palpations: There is no mass. Tenderness: There is no abdominal tenderness. There is no left CVA tenderness or rebound. Hernia: No hernia is present. Musculoskeletal:         General: Normal range of motion. Cervical back: Normal range of motion and neck supple. Skin:     General: Skin is warm and dry. Coloration: Skin is not pale. Findings: No erythema or rash. Neurological:      Mental Status: She is alert and oriented to person, place, and time. Psychiatric:         Behavior: Behavior normal.         Thought Content:  Thought content normal.         Judgment: Judgment normal.          Results for orders placed or performed during the hospital encounter of 08/17/22   TSH   Result Value Ref Range    TSH 3.320 0.400 - 4.200 uIU/mL   Hemoglobin A1C   Result Value Ref Range    Hemoglobin A1C 5.6 4.4 - 6.4 %    AVERAGE GLUCOSE 108 70 - 126 mg/dL   T4, Free   Result Value Ref Range    T4 Free 1.45 0.93 - 1.76 ng/dL   Lipid, Fasting   Result Value Ref Range    Cholesterol, Fasting 241 (H) 100 - 199 mg/dl    Triglyceride, Fasting 66 0 - 199 mg/dl    HDL 63 mg/dL    LDL Calculated 165 mg/dL   Comprehensive Metabolic Panel   Result Value Ref Range    Glucose 103 70 - 108 mg/dL    Creatinine 0.7 0.4 - 1.2 mg/dL    BUN 17 7 - 22 mg/dL    Sodium 142 135 - 145 meq/L    Potassium 4.7 3.5 - 5.2 meq/L    Chloride 103 98 - 111 meq/L    CO2 26 23 - 33 meq/L    Calcium 9.9 8.5 - 10.5 mg/dL    AST 19 5 - 40 U/L    Alkaline Phosphatase 75 38 - 126 U/L    Total Protein 6.9 6.1 - 8.0 g/dL    Albumin 4.6 3.5 - 5.1 g/dL    Total Bilirubin 0.4 0.3 - 1.2 mg/dL    ALT 16 11 - 66 U/L   Anion Gap   Result Value Ref Range    Anion Gap 13.0 8.0 - 16.0 meq/L   Glomerular Filtration Rate, Estimated   Result Value Ref Range    Est, Glom Filt Rate 84 (A) ml/min/1.73m2       Assessment:     Family history of colon carcinoma in her sister who is in need of screening colonoscopy. We did discuss the Cologuard testing and its 9% false negative rate and the fact its not recommended for people that are at high risk which is her case. We discussed the preparation the sedation the procedure itself and the risk of perforation and need for surgical correction he voices understanding would like to proceed    Plan:     1. Schedule [facility] for colonoscopy  2. The risks, benefits and alternatives were discussed with Magdaleno Denton. She understands and wishes to proceed with surgical intervention. 3. Restrictions discussed with [facility] Licear and she expresses understanding.   4. She is advised to call back directly if there are further questions/concerns, or if her symptoms worsen prior to surgery.           Electronicallysigned by Renee Leon MD on 9/13/2022 at 9:00 AM

## 2022-09-28 NOTE — H&P
[address] [address]  History and Physical Update      Pt Name: Yaron Quick  MRN: [id number]  YOB: 1955  Date of evaluation: 9/27/2022    [x] I have examined the patient and reviewed the H&P/Consult and there are no changes to the patient or plans. [] I have examined the patient and reviewed the H&P/Consult and have noted the following changes:         Zac Atwood MD  Electronically signed 9/27/2022 at 11:10 PM

## 2022-09-28 NOTE — ANESTHESIA PRE PROCEDURE
Department of Anesthesiology  Preprocedure Note       Name:  Darion Goins   Age:  78 y.o.  :  1955                                          MRN:  [id number]         Date:  2022      Surgeon: Sobia Cuevas):  Andie Hayes MD    Procedure: Procedure(s):  COLONOSCOPY    Medications prior to admission:   Prior to Admission medications    Medication Sig Start Date End Date Taking? Authorizing Provider   levothyroxine (SYNTHROID) 100 MCG tablet TAKE 1 TABLET DAILY 22   Renée Mcdaniel MD   calcium carbonate (OSCAL) 500 MG TABS tablet Take 500 mg by mouth daily    Historical Provider, MD   Turmeric 400 MG CAPS Take by mouth    Historical Provider, MD   Ascorbic Acid (VITAMIN C) 500 MG tablet Take 500 mg by mouth daily    Historical Provider, MD   Cyanocobalamin (VITAMIN B 12 PO) Take by mouth    Historical Provider, MD   Omega-3 Fatty Acids (FISH OIL) 1000 MG CAPS Take 3,000 mg by mouth 3 times daily    Historical Provider, MD       Current medications:    Current Facility-Administered Medications   Medication Dose Route Frequency Provider Last Rate Last Admin    0.45 % sodium chloride infusion   IntraVENous Continuous Bonny Mackay  mL/hr at  0829 New Bag at 22 7760       Allergies:  No Known Allergies    Problem List:    Patient Active Problem List   Diagnosis Code    Chest pain, atypical R07.89    Hypothyroidism E03.9    Family history of early CAD her sister had stent at age 48yrs Z80.55   [de-identified] Elevated fasting glucose R73.01    DDD (degenerative disc disease), lumbar M51.36    Elevated hemoglobin A1c R73.09    Other hyperlipidemia E78.49       Past Medical History:        Diagnosis Date    Thyroid disease     hypothyroidism       Past Surgical History:        Procedure Laterality Date    CHOLECYSTECTOMY  ?     Dr. Hever May COLONOSCOPY  2017    Dr. Itz Barrios  [address] [facility] Left 2017 ENDOSCOPY COLONOSCOPY SCREENING performed by Mayra Barrios MD at [facility]       Social History:    Social History     Tobacco Use    Smoking status: Never    Smokeless tobacco: Never   Substance Use Topics    Alcohol use: Not Currently     Comment: occasionally                                Counseling given: Not Answered      Vital Signs (Current):   Vitals:    09/28/22 0814   BP: 102/63   Pulse: 51   Resp: 16   Temp: 36.1 °C (96.9 °F)   TempSrc: Temporal   SpO2: 98%   Weight: 149 lb 9.6 oz (67.9 kg)   Height: 5' 4\" (1.626 m)                                              BP Readings from Last 3 Encounters:   09/28/22 102/63   09/13/22 (!) 120/50   08/22/22 130/72       NPO Status: Time of last liquid consumption: 0500                        Time of last solid consumption: 1700                        Date of last liquid consumption: 09/28/22                        Date of last solid food consumption: 09/26/22    BMI:   Wt Readings from Last 3 Encounters:   09/28/22 149 lb 9.6 oz (67.9 kg)   09/13/22 156 lb (70.8 kg)   08/22/22 152 lb 6.4 oz (69.1 kg)     Body mass index is 25.68 kg/m².     CBC:   Lab Results   Component Value Date/Time    WBC 7.2 07/27/2012 06:45 PM    RBC 4.40 07/27/2012 06:45 PM    HGB 14.1 07/27/2012 06:45 PM    HCT 40.4 07/27/2012 06:45 PM    MCV 91.9 07/27/2012 06:45 PM    RDW 12.8 07/27/2012 06:45 PM     07/27/2012 06:45 PM       CMP:   Lab Results   Component Value Date/Time     08/17/2022 07:28 AM    K 4.7 08/17/2022 07:28 AM     08/17/2022 07:28 AM    CO2 26 08/17/2022 07:28 AM    BUN 17 08/17/2022 07:28 AM    CREATININE 0.7 08/17/2022 07:28 AM    LABGLOM 84 08/17/2022 07:28 AM    GLUCOSE 103 08/17/2022 07:28 AM    GLUCOSE 100 07/19/2011 06:26 AM    PROT 6.9 08/17/2022 07:28 AM    CALCIUM 9.9 08/17/2022 07:28 AM    BILITOT 0.4 08/17/2022 07:28 AM    ALKPHOS 75 08/17/2022 07:28 AM    AST 19 08/17/2022 07:28 AM    ALT 16 08/17/2022 07:28 AM       POC Tests: No results for input(s): POCGLU, POCNA, POCK, POCCL, POCBUN, POCHEMO, POCHCT in the last 72 hours. Coags: No results found for: PROTIME, INR, APTT    HCG (If Applicable): No results found for: PREGTESTUR, PREGSERUM, HCG, HCGQUANT     ABGs: No results found for: PHART, PO2ART, IGQ5YSB, BFU0UWX, BEART, Y3ZRJTVH     Type & Screen (If Applicable):  No results found for: LABABO, LABRH    Drug/Infectious Status (If Applicable):  No results found for: HIV, HEPCAB    COVID-19 Screening (If Applicable):   Lab Results   Component Value Date/Time    COVID19 Positive 12/21/2021 03:45 PM           Anesthesia Evaluation  Patient summary reviewed and Nursing notes reviewed  Airway: Mallampati: II  TM distance: <3 FB   Neck ROM: full  Mouth opening: > = 3 FB   Dental: normal exam         Pulmonary:Negative Pulmonary ROS and normal exam                               Cardiovascular:Negative CV ROS          ECG reviewed      Echocardiogram reviewed                  Neuro/Psych:   Negative Neuro/Psych ROS              GI/Hepatic/Renal: Neg GI/Hepatic/Renal ROS            Endo/Other:    (+) hypothyroidism::., .                 Abdominal:             Vascular: negative vascular ROS. Other Findings:           Anesthesia Plan      MAC     ASA 2       Induction: intravenous. Anesthetic plan and risks discussed with patient and spouse. Use of blood products discussed with patient and spouse whom. Plan discussed with attending.                     MARY Lim CRNA   9/28/2022

## 2022-09-28 NOTE — BRIEF OP NOTE
Brief Postoperative Note      Patient: Kamran Dexter  YOB: 1955  MRN: [id number]    Date of Procedure: 9/28/2022    Pre-Op Diagnosis: Encounter for screening colonoscopy F HX colon Ca    Post-Op Diagnosis: Normal Colonoscopy       Procedure(s):  COLONOSCOPY    Surgeon(s):  Mackenzie Delgadillo MD    Assistant:      Anesthesia: Monitor Anesthesia Care    Estimated Blood Loss (mL):1 ml    Complications: none    Specimens:       Implants:        Drains:     Findings: see op note    Electronically signed by Mackenzie Delgadillo MD on 9/28/2022 at 10:03 AM

## 2022-09-28 NOTE — PROGRESS NOTES
956- Pt arrived in Endo Recovery    1000- Pt passing gas at this time    1004- Pt drinking fluid at this time    1005- Dr. Demian Powers at bedside to speak w/ pt and family member    80- IV removed by this RN    1024- This RN went over discharge instructions w/ the pt and     [id number]- Pt discharged from [facility]

## 2022-09-29 NOTE — OP NOTE
[address] [place] [address]                                OPERATIVE REPORT    PATIENT NAME: Assunta Leyden                   :        1955  MED REC NO:   [id number]                           ROOM:  ACCOUNT NO:   [de-identified]                           ADMIT DATE: 2022  PROVIDER:     Jass Bah MD    DATE OF PROCEDURE:  2022    PREOPERATIVE DIAGNOSIS:  Family history of colon carcinoma, here for  colonoscopy surveillance. POSTOPERATIVE DIAGNOSES:  Normal colonoscopy to cecum except for very  tortuous colon. No evidence of polyps or diverticula. OPERATION PERFORMED:  Colonoscopy. SURGEON:  Jass Bah MD    ANESTHESIA:  Diprivan. COMPLICATIONS:  None. Blood Loss 1 ml  INDICATION FOR PROCEDURE:  The patient is a 43-year-old white female  whose sister had colon cancer. She is here for colonoscopy  surveillance. FINDINGS:  The patient had a very tortuous colon, but otherwise, normal  colonoscopy to cecum. No evidence of polyps. DESCRIPTION OF PROCEDURE:  The patient was brought to the endoscopy  suite, placed in the left lateral decubitus position. After adequate  Diprivan anesthesia was administered, the Olympus endoscope was inserted  in the anus and fairly easily passed up through the rectum up through  the sigmoid colon up through the descending colon, but then, there were  some kind of sharp curves in the sigmoid and descending. We continued  to pass the scope up and we got what we felt to be the hepatic flexure  and I could not get the scope to pass around the flexure. For this  reason, we placed the patient in the supine position. Still with some  difficulty in the abdominal wall compression, I was able to get the  scope into the cecum. The appendiceal orifice could be seen as could be  the ileum.   At this point in time, the scope was removed retrograde and  as we would remove retrograde, we would reinsert the scope to view the  preceding area. We continued in this manner of insertion-withdrawal,  insertion-withdrawal all the way back through the descending colon,  sigmoid colon. The scope was removed. The patient tolerated the  procedure well. KASIE PÉREZ [facility], MD    D: 09/28/2022 10:11:53       T: 09/28/2022 10:15:37     [email or web address]  Job#: [id number]     Doc#: [id number]    CC:

## 2022-11-02 DIAGNOSIS — Z12.11 ENCOUNTER FOR SCREENING COLONOSCOPY: ICD-10-CM

## 2022-12-12 ENCOUNTER — OFFICE VISIT (OUTPATIENT)
Dept: FAMILY MEDICINE CLINIC | Age: 67
End: 2022-12-12
Payer: MEDICARE

## 2022-12-12 VITALS
WEIGHT: 148.8 LBS | TEMPERATURE: 98.6 F | HEIGHT: 64 IN | RESPIRATION RATE: 16 BRPM | SYSTOLIC BLOOD PRESSURE: 115 MMHG | HEART RATE: 76 BPM | OXYGEN SATURATION: 99 % | DIASTOLIC BLOOD PRESSURE: 76 MMHG | BODY MASS INDEX: 25.4 KG/M2

## 2022-12-12 DIAGNOSIS — J01.90 ACUTE BACTERIAL SINUSITIS: Primary | ICD-10-CM

## 2022-12-12 DIAGNOSIS — B96.89 ACUTE BACTERIAL SINUSITIS: Primary | ICD-10-CM

## 2022-12-12 PROCEDURE — 3017F COLORECTAL CA SCREEN DOC REV: CPT | Performed by: FAMILY MEDICINE

## 2022-12-12 PROCEDURE — 99213 OFFICE O/P EST LOW 20 MIN: CPT | Performed by: FAMILY MEDICINE

## 2022-12-12 PROCEDURE — G8427 DOCREV CUR MEDS BY ELIG CLIN: HCPCS | Performed by: FAMILY MEDICINE

## 2022-12-12 PROCEDURE — 1123F ACP DISCUSS/DSCN MKR DOCD: CPT | Performed by: FAMILY MEDICINE

## 2022-12-12 PROCEDURE — G8484 FLU IMMUNIZE NO ADMIN: HCPCS | Performed by: FAMILY MEDICINE

## 2022-12-12 PROCEDURE — 1036F TOBACCO NON-USER: CPT | Performed by: FAMILY MEDICINE

## 2022-12-12 PROCEDURE — 1090F PRES/ABSN URINE INCON ASSESS: CPT | Performed by: FAMILY MEDICINE

## 2022-12-12 PROCEDURE — G8399 PT W/DXA RESULTS DOCUMENT: HCPCS | Performed by: FAMILY MEDICINE

## 2022-12-12 PROCEDURE — G8417 CALC BMI ABV UP PARAM F/U: HCPCS | Performed by: FAMILY MEDICINE

## 2022-12-12 RX ORDER — DOXYCYCLINE HYCLATE 100 MG
100 TABLET ORAL 2 TIMES DAILY
Qty: 20 TABLET | Refills: 0 | Status: SHIPPED | OUTPATIENT
Start: 2022-12-12 | End: 2022-12-22

## 2022-12-12 ASSESSMENT — ENCOUNTER SYMPTOMS
RHINORRHEA: 0
SINUS COMPLAINT: 1
SHORTNESS OF BREATH: 0
WHEEZING: 0
ABDOMINAL PAIN: 0
SINUS PRESSURE: 1
COUGH: 1
DIARRHEA: 0
SORE THROAT: 0

## 2022-12-12 NOTE — PROGRESS NOTES
[facility]  [facility]  [address] [facility]  Dept: [phone]  Dept Fax: [phone]  Loc: [phone]  PROGRESS NOTE      Visit Date: 12/12/2022    Tacos Rios is a 78 y.o. female who presents today for:  Chief Complaint   Patient presents with    Sinus Problem     Started a week ago with runny nose, cough, headache, and congestion. Subjective:  Sinus Problem  Associated symptoms include congestion, coughing, ear pain and sinus pressure. Pertinent negatives include no chills, shortness of breath or sore throat. Nasal congestion. Tired. Has sinus pressure. Started about 8 days ago. Getting a little better. Eating less. No body aches. Did not do a covid test    Review of Systems   Constitutional:  Positive for fever. Negative for chills and fatigue. HENT:  Positive for congestion, ear pain and sinus pressure. Negative for rhinorrhea and sore throat. Respiratory:  Positive for cough. Negative for shortness of breath and wheezing. Gastrointestinal:  Negative for abdominal pain and diarrhea. Past Medical History:   Diagnosis Date    Thyroid disease     hypothyroidism      Current Outpatient Medications   Medication Sig Dispense Refill    levothyroxine (SYNTHROID) 100 MCG tablet TAKE 1 TABLET DAILY 90 tablet 3    calcium carbonate (OSCAL) 500 MG TABS tablet Take 500 mg by mouth daily      Turmeric 400 MG CAPS Take by mouth      Ascorbic Acid (VITAMIN C) 500 MG tablet Take 500 mg by mouth daily      Cyanocobalamin (VITAMIN B 12 PO) Take by mouth      Omega-3 Fatty Acids (FISH OIL) 1000 MG CAPS Take 3,000 mg by mouth 3 times daily       No current facility-administered medications for this visit. No Known Allergies    Objective:     /76   Pulse 76   Temp 98.6 °F (37 °C)   Resp 16   Ht 5' 4\" (1.626 m)   Wt 148 lb 12.8 oz (67.5 kg)   SpO2 99%   BMI 25.54 kg/m²   Physical Exam  Vitals reviewed. Constitutional:       General: She is not in acute distress. Appearance: She is not ill-appearing. HENT:      Right Ear: Tympanic membrane, ear canal and external ear normal.      Left Ear: Tympanic membrane, ear canal and external ear normal.      Nose:      Right Sinus: No maxillary sinus tenderness or frontal sinus tenderness. Left Sinus: No maxillary sinus tenderness or frontal sinus tenderness. Mouth/Throat:      Mouth: Mucous membranes are moist.      Pharynx: No oropharyngeal exudate or posterior oropharyngeal erythema. Cardiovascular:      Rate and Rhythm: Normal rate and regular rhythm. Heart sounds: No murmur heard. Pulmonary:      Effort: Pulmonary effort is normal. No respiratory distress. Breath sounds: Normal breath sounds. No wheezing or rhonchi. Neurological:      Mental Status: She is alert. Psychiatric:         Mood and Affect: Mood normal.         Behavior: Behavior normal.       Impression/Plan:  1. Acute bacterial sinusitis  For over 1 week. No respiratory distress. Appropriate for outpatient management. Proceed with antibiotic. Over-the-counter cough and cold meds  - doxycycline hyclate (VIBRA-TABS) 100 MG tablet; Take 1 tablet by mouth 2 times daily for 10 days  Dispense: 20 tablet; Refill: 0      They voiced understanding. All questions answered. They agreed with treatment plan. See patient instructions for any educational materials that may have been given. Discussed use, benefit, and side effects of prescribed medications. (Please note that portions of this note may have been completed with a voice recognition program.  Efforts were made to edit the dictation but occasionally words are mis-transcribed.)    Return if symptoms worsen or fail to improve.        Electronically signed by Adi Chong MD on 12/12/2022 at 9:54 AM

## 2023-08-21 ENCOUNTER — TELEPHONE (OUTPATIENT)
Dept: FAMILY MEDICINE CLINIC | Age: 68
End: 2023-08-21

## 2023-08-21 DIAGNOSIS — E03.8 OTHER SPECIFIED HYPOTHYROIDISM: ICD-10-CM

## 2023-08-21 DIAGNOSIS — E78.49 OTHER HYPERLIPIDEMIA: ICD-10-CM

## 2023-08-21 DIAGNOSIS — R73.09 ELEVATED GLUCOSE: Primary | ICD-10-CM

## 2023-08-21 SDOH — HEALTH STABILITY: PHYSICAL HEALTH: ON AVERAGE, HOW MANY MINUTES DO YOU ENGAGE IN EXERCISE AT THIS LEVEL?: 60 MIN

## 2023-08-21 SDOH — ECONOMIC STABILITY: FOOD INSECURITY: WITHIN THE PAST 12 MONTHS, THE FOOD YOU BOUGHT JUST DIDN'T LAST AND YOU DIDN'T HAVE MONEY TO GET MORE.: NEVER TRUE

## 2023-08-21 SDOH — ECONOMIC STABILITY: HOUSING INSECURITY
IN THE LAST 12 MONTHS, WAS THERE A TIME WHEN YOU DID NOT HAVE A STEADY PLACE TO SLEEP OR SLEPT IN A SHELTER (INCLUDING NOW)?: NO

## 2023-08-21 SDOH — ECONOMIC STABILITY: INCOME INSECURITY: HOW HARD IS IT FOR YOU TO PAY FOR THE VERY BASICS LIKE FOOD, HOUSING, MEDICAL CARE, AND HEATING?: NOT HARD AT ALL

## 2023-08-21 SDOH — ECONOMIC STABILITY: FOOD INSECURITY: WITHIN THE PAST 12 MONTHS, YOU WORRIED THAT YOUR FOOD WOULD RUN OUT BEFORE YOU GOT MONEY TO BUY MORE.: NEVER TRUE

## 2023-08-21 SDOH — HEALTH STABILITY: PHYSICAL HEALTH: ON AVERAGE, HOW MANY DAYS PER WEEK DO YOU ENGAGE IN MODERATE TO STRENUOUS EXERCISE (LIKE A BRISK WALK)?: 4 DAYS

## 2023-08-21 ASSESSMENT — LIFESTYLE VARIABLES
HOW OFTEN DO YOU HAVE A DRINK CONTAINING ALCOHOL: 4
HOW OFTEN DO YOU HAVE A DRINK CONTAINING ALCOHOL: 2-3 TIMES A WEEK
HOW OFTEN DO YOU HAVE SIX OR MORE DRINKS ON ONE OCCASION: 1
HOW MANY STANDARD DRINKS CONTAINING ALCOHOL DO YOU HAVE ON A TYPICAL DAY: 1 OR 2
HOW MANY STANDARD DRINKS CONTAINING ALCOHOL DO YOU HAVE ON A TYPICAL DAY: 1

## 2023-08-21 ASSESSMENT — PATIENT HEALTH QUESTIONNAIRE - PHQ9
2. FEELING DOWN, DEPRESSED OR HOPELESS: 0
SUM OF ALL RESPONSES TO PHQ QUESTIONS 1-9: 0
SUM OF ALL RESPONSES TO PHQ9 QUESTIONS 1 & 2: 0
SUM OF ALL RESPONSES TO PHQ QUESTIONS 1-9: 0
1. LITTLE INTEREST OR PLEASURE IN DOING THINGS: 0

## 2023-08-21 NOTE — TELEPHONE ENCOUNTER
Patient informed and verbalized understanding. A copy of lab orders here for patient to  at her convenience.

## 2023-08-21 NOTE — TELEPHONE ENCOUNTER
----- Message from Isa Vivian sent at 8/21/2023 10:51 AM EDT -----  Subject: Message to Provider    QUESTIONS  Information for Provider? pt has thyroid f/u appt. on 8/30 and is asking   for blood work to be in chart beforehand (thyroid and maybe a1c)  ---------------------------------------------------------------------------  --------------  Sidra CORTEZ  [phone]; OK to leave message on voicemail  ---------------------------------------------------------------------------  --------------  SCRIPT ANSWERS  Relationship to Patient?  Self

## 2023-08-24 ENCOUNTER — HOSPITAL ENCOUNTER (OUTPATIENT)
Age: 68
Discharge: HOME | End: 2023-08-24
Payer: MEDICARE

## 2023-08-24 ENCOUNTER — OFFICE VISIT (OUTPATIENT)
Dept: FAMILY MEDICINE CLINIC | Age: 68
End: 2023-08-24

## 2023-08-24 VITALS
BODY MASS INDEX: 26.19 KG/M2 | HEART RATE: 65 BPM | HEIGHT: 64 IN | SYSTOLIC BLOOD PRESSURE: 111 MMHG | DIASTOLIC BLOOD PRESSURE: 71 MMHG | RESPIRATION RATE: 16 BRPM | OXYGEN SATURATION: 98 % | TEMPERATURE: 97.7 F | WEIGHT: 153.4 LBS

## 2023-08-24 DIAGNOSIS — E78.49 OTHER HYPERLIPIDEMIA: ICD-10-CM

## 2023-08-24 DIAGNOSIS — R73.09 ELEVATED GLUCOSE: ICD-10-CM

## 2023-08-24 DIAGNOSIS — E03.8 OTHER SPECIFIED HYPOTHYROIDISM: ICD-10-CM

## 2023-08-24 DIAGNOSIS — Z12.31 BREAST CANCER SCREENING BY MAMMOGRAM: ICD-10-CM

## 2023-08-24 DIAGNOSIS — Z00.00 MEDICARE ANNUAL WELLNESS VISIT, SUBSEQUENT: Primary | ICD-10-CM

## 2023-08-24 LAB
CHOLEST SERPL-MCNC: 208 MG/DL (ref 100–199)
DEPRECATED MEAN GLUCOSE BLD GHB EST-ACNC: 105 MG/DL (ref 70–126)
HBA1C MFR BLD HPLC: 5.5 % (ref 4.4–6.4)
HDLC SERPL-MCNC: 59 MG/DL
LDLC SERPL CALC-MCNC: 139 MG/DL
T4 FREE SERPL-MCNC: 1.31 NG/DL (ref 0.93–1.76)
TRIGL SERPL-MCNC: 51 MG/DL (ref 0–199)
TSH SERPL DL<=0.005 MIU/L-ACNC: 3.09 UIU/ML (ref 0.4–4.2)

## 2023-08-24 PROCEDURE — 83036 HEMOGLOBIN GLYCOSYLATED A1C: CPT

## 2023-08-24 PROCEDURE — 80061 LIPID PANEL: CPT

## 2023-08-24 PROCEDURE — 84443 ASSAY THYROID STIM HORMONE: CPT

## 2023-08-24 PROCEDURE — 84439 ASSAY OF FREE THYROXINE: CPT

## 2023-08-24 PROCEDURE — 36415 COLL VENOUS BLD VENIPUNCTURE: CPT

## 2023-08-24 RX ORDER — LEVOTHYROXINE SODIUM 0.1 MG/1
TABLET ORAL
Qty: 90 TABLET | Refills: 3 | Status: SHIPPED | OUTPATIENT
Start: 2023-08-24

## 2023-08-24 SDOH — ECONOMIC STABILITY: FOOD INSECURITY: WITHIN THE PAST 12 MONTHS, THE FOOD YOU BOUGHT JUST DIDN'T LAST AND YOU DIDN'T HAVE MONEY TO GET MORE.: NEVER TRUE

## 2023-08-24 SDOH — ECONOMIC STABILITY: FOOD INSECURITY: WITHIN THE PAST 12 MONTHS, YOU WORRIED THAT YOUR FOOD WOULD RUN OUT BEFORE YOU GOT MONEY TO BUY MORE.: NEVER TRUE

## 2023-08-24 SDOH — ECONOMIC STABILITY: INCOME INSECURITY: HOW HARD IS IT FOR YOU TO PAY FOR THE VERY BASICS LIKE FOOD, HOUSING, MEDICAL CARE, AND HEATING?: NOT HARD AT ALL

## 2023-09-05 ENCOUNTER — OFFICE VISIT (OUTPATIENT)
Dept: FAMILY MEDICINE CLINIC | Age: 68
End: 2023-09-05
Payer: MEDICARE

## 2023-09-05 VITALS
HEART RATE: 61 BPM | SYSTOLIC BLOOD PRESSURE: 100 MMHG | OXYGEN SATURATION: 98 % | RESPIRATION RATE: 16 BRPM | HEIGHT: 64 IN | DIASTOLIC BLOOD PRESSURE: 62 MMHG | TEMPERATURE: 98.3 F | WEIGHT: 153 LBS | BODY MASS INDEX: 26.12 KG/M2

## 2023-09-05 DIAGNOSIS — B34.9 VIRAL ILLNESS: Primary | ICD-10-CM

## 2023-09-05 DIAGNOSIS — J02.9 ACUTE PHARYNGITIS, UNSPECIFIED ETIOLOGY: ICD-10-CM

## 2023-09-05 LAB — STREPTOCOCCUS A RNA: NEGATIVE

## 2023-09-05 PROCEDURE — 1036F TOBACCO NON-USER: CPT | Performed by: FAMILY MEDICINE

## 2023-09-05 PROCEDURE — G8399 PT W/DXA RESULTS DOCUMENT: HCPCS | Performed by: FAMILY MEDICINE

## 2023-09-05 PROCEDURE — 87651 STREP A DNA AMP PROBE: CPT | Performed by: FAMILY MEDICINE

## 2023-09-05 PROCEDURE — G8427 DOCREV CUR MEDS BY ELIG CLIN: HCPCS | Performed by: FAMILY MEDICINE

## 2023-09-05 PROCEDURE — 1123F ACP DISCUSS/DSCN MKR DOCD: CPT | Performed by: FAMILY MEDICINE

## 2023-09-05 PROCEDURE — G8417 CALC BMI ABV UP PARAM F/U: HCPCS | Performed by: FAMILY MEDICINE

## 2023-09-05 PROCEDURE — 99213 OFFICE O/P EST LOW 20 MIN: CPT | Performed by: FAMILY MEDICINE

## 2023-09-05 PROCEDURE — 3017F COLORECTAL CA SCREEN DOC REV: CPT | Performed by: FAMILY MEDICINE

## 2023-09-05 PROCEDURE — 1090F PRES/ABSN URINE INCON ASSESS: CPT | Performed by: FAMILY MEDICINE

## 2023-09-05 ASSESSMENT — ENCOUNTER SYMPTOMS
SHORTNESS OF BREATH: 0
SORE THROAT: 1
COUGH: 1
WHEEZING: 0
RHINORRHEA: 0

## 2023-10-11 ENCOUNTER — HOSPITAL ENCOUNTER (OUTPATIENT)
Dept: MAMMOGRAPHY | Age: 68
Discharge: HOME | End: 2023-10-11
Attending: FAMILY MEDICINE
Payer: MEDICARE

## 2023-10-11 VITALS — HEIGHT: 64 IN | WEIGHT: 155 LBS | BODY MASS INDEX: 26.46 KG/M2

## 2023-10-11 DIAGNOSIS — Z12.31 BREAST CANCER SCREENING BY MAMMOGRAM: ICD-10-CM

## 2023-10-11 PROCEDURE — 77063 BREAST TOMOSYNTHESIS BI: CPT

## 2024-01-23 ENCOUNTER — TELEPHONE (OUTPATIENT)
Dept: FAMILY MEDICINE CLINIC | Age: 69
End: 2024-01-23

## 2024-01-23 NOTE — TELEPHONE ENCOUNTER
Patient has new pharmacy the first of the year.  All her meds now need to be sent to [facility].  Please change pharmacy in computer.      She does not her Synthroid sent to the [facility] now.

## 2024-01-24 DIAGNOSIS — E03.8 OTHER SPECIFIED HYPOTHYROIDISM: ICD-10-CM

## 2024-01-24 RX ORDER — LEVOTHYROXINE SODIUM 0.1 MG/1
TABLET ORAL
Qty: 90 TABLET | Refills: 3 | Status: SHIPPED | OUTPATIENT
Start: 2024-01-24

## 2024-01-24 NOTE — TELEPHONE ENCOUNTER
Diamond Ortiz called requesting a refill on the following medications:  Requested Prescriptions     Pending Prescriptions Disp Refills    levothyroxine (SYNTHROID) 100 MCG tablet 90 tablet 3     Sig: TAKE 1 TABLET DAILY       Date of last visit: 9/5/2023  Date of next visit (if applicable):Visit date not found  Date of last refill: 8/24/2023  Pharmacy Name: [facility]     Patient just switched to [facility] and needs a new prescription sent to them.       Thanks,  Sayda [place] RN

## 2024-03-21 DIAGNOSIS — E03.8 OTHER SPECIFIED HYPOTHYROIDISM: ICD-10-CM

## 2024-03-21 RX ORDER — LEVOTHYROXINE SODIUM 0.1 MG/1
TABLET ORAL
Qty: 5 TABLET | Refills: 0 | Status: SHIPPED | OUTPATIENT
Start: 2024-03-21

## 2024-03-21 NOTE — TELEPHONE ENCOUNTER
Patient called stated she left to go to [place] for he week and left her Synthroid at home and is wanting to know if she can get a 5 day supply sent to [facility] in [place], [place] at [address] [place] [address]

## 2024-05-16 ENCOUNTER — OFFICE VISIT (OUTPATIENT)
Dept: FAMILY MEDICINE CLINIC | Age: 69
End: 2024-05-16

## 2024-05-16 VITALS
TEMPERATURE: 97.4 F | SYSTOLIC BLOOD PRESSURE: 102 MMHG | WEIGHT: 158.6 LBS | RESPIRATION RATE: 16 BRPM | HEIGHT: 64 IN | BODY MASS INDEX: 27.08 KG/M2 | DIASTOLIC BLOOD PRESSURE: 70 MMHG | HEART RATE: 68 BPM | OXYGEN SATURATION: 98 %

## 2024-05-16 DIAGNOSIS — R05.1 ACUTE COUGH: Primary | ICD-10-CM

## 2024-05-16 RX ORDER — DOXYCYCLINE HYCLATE 100 MG
100 TABLET ORAL 2 TIMES DAILY
Qty: 20 TABLET | Refills: 0 | Status: SHIPPED | OUTPATIENT
Start: 2024-05-16 | End: 2024-05-26

## 2024-05-16 ASSESSMENT — PATIENT HEALTH QUESTIONNAIRE - PHQ9
SUM OF ALL RESPONSES TO PHQ QUESTIONS 1-9: 0
SUM OF ALL RESPONSES TO PHQ9 QUESTIONS 1 & 2: 0
1. LITTLE INTEREST OR PLEASURE IN DOING THINGS: NOT AT ALL
SUM OF ALL RESPONSES TO PHQ QUESTIONS 1-9: 0
2. FEELING DOWN, DEPRESSED OR HOPELESS: NOT AT ALL
SUM OF ALL RESPONSES TO PHQ QUESTIONS 1-9: 0
SUM OF ALL RESPONSES TO PHQ QUESTIONS 1-9: 0

## 2024-05-16 ASSESSMENT — ENCOUNTER SYMPTOMS
SHORTNESS OF BREATH: 0
RHINORRHEA: 0
SORE THROAT: 0
WHEEZING: 0

## 2024-05-16 NOTE — PROGRESS NOTES
MD Vishnu at [facility]     Family History   Problem Relation Age of Onset    Heart Disease Father     Diabetes Father     Heart Disease Sister     Cancer Sister         Colon    Heart Disease Maternal Grandfather     Stroke Paternal Grandfather      Social History     Tobacco Use    Smoking status: Never    Smokeless tobacco: Never   Substance Use Topics    Alcohol use: Yes     Alcohol/week: 4.0 standard drinks of alcohol     Types: 3 Glasses of wine, 1 Cans of beer per week     Comment: occasionally      Current Outpatient Medications   Medication Sig Dispense Refill    levothyroxine (SYNTHROID) 100 MCG tablet TAKE 1 TABLET DAILY 5 tablet 0    calcium carbonate (OSCAL) 500 MG TABS tablet Take 1 tablet by mouth daily      Turmeric 400 MG CAPS Take by mouth      Ascorbic Acid (VITAMIN C) 500 MG tablet Take 1 tablet by mouth daily      Cyanocobalamin (VITAMIN B 12 PO) Take by mouth      Omega-3 Fatty Acids (FISH OIL) 1000 MG CAPS Take 3 capsules by mouth 3 times daily       No current facility-administered medications for this visit.     No Known Allergies  Health Maintenance   Topic Date Due    COVID-19 Vaccine (1) Never done    Hepatitis C screen  Never done    DTaP/Tdap/Td vaccine (1 - Tdap) Never done    Respiratory Syncytial Virus (RSV) Pregnant or age 60 yrs+ (1 - 1-dose 60+ series) Never done    Pneumococcal 65+ years Vaccine (2 of 2 - PCV) 08/16/2022    Flu vaccine (Season Ended) 08/01/2024    Depression Screen  08/21/2024    A1C test (Diabetic or Prediabetic)  08/24/2024    Annual Wellness Visit (Medicare)  08/24/2024    Breast cancer screen  10/11/2025    Colorectal Cancer Screen  09/28/2027    Lipids  08/24/2028    DEXA (modify frequency per FRAX score)  Completed    Shingles vaccine  Completed    Hepatitis A vaccine  Aged Out    Hepatitis B vaccine  Aged Out    Hib vaccine  Aged Out    Polio vaccine  Aged Out    Meningococcal (ACWY) vaccine  Aged Out    Pneumococcal 0-64 years Vaccine

## 2024-08-30 ENCOUNTER — TELEPHONE (OUTPATIENT)
Dept: FAMILY MEDICINE CLINIC | Age: 69
End: 2024-08-30

## 2024-08-30 DIAGNOSIS — R73.09 ELEVATED GLUCOSE: ICD-10-CM

## 2024-08-30 DIAGNOSIS — E03.8 OTHER SPECIFIED HYPOTHYROIDISM: Primary | ICD-10-CM

## 2024-08-30 DIAGNOSIS — E78.49 OTHER HYPERLIPIDEMIA: ICD-10-CM

## 2024-08-30 NOTE — TELEPHONE ENCOUNTER
Pt is needing a refill on her synthroid but wanted to know if she will need labs before it can be refilled?

## 2024-09-03 ENCOUNTER — HOSPITAL ENCOUNTER (OUTPATIENT)
Age: 69
Discharge: HOME | End: 2024-09-03
Payer: MEDICARE

## 2024-09-03 DIAGNOSIS — E78.49 OTHER HYPERLIPIDEMIA: ICD-10-CM

## 2024-09-03 DIAGNOSIS — E03.8 OTHER SPECIFIED HYPOTHYROIDISM: ICD-10-CM

## 2024-09-03 DIAGNOSIS — R73.09 ELEVATED GLUCOSE: ICD-10-CM

## 2024-09-03 LAB
CHOLEST SERPL-MCNC: 240 MG/DL (ref 100–199)
DEPRECATED MEAN GLUCOSE BLD GHB EST-ACNC: 102 MG/DL (ref 70–126)
HBA1C MFR BLD HPLC: 5.4 % (ref 4.4–6.4)
HDLC SERPL-MCNC: 59 MG/DL
LDLC SERPL CALC-MCNC: 166 MG/DL
T4 FREE SERPL-MCNC: 1.61 NG/DL (ref 0.93–1.68)
TRIGL SERPL-MCNC: 74 MG/DL (ref 0–199)
TSH SERPL DL<=0.005 MIU/L-ACNC: 2.85 UIU/ML (ref 0.4–4.2)

## 2024-09-03 PROCEDURE — 83036 HEMOGLOBIN GLYCOSYLATED A1C: CPT

## 2024-09-03 PROCEDURE — 36415 COLL VENOUS BLD VENIPUNCTURE: CPT

## 2024-09-03 PROCEDURE — 80061 LIPID PANEL: CPT

## 2024-09-03 PROCEDURE — 84443 ASSAY THYROID STIM HORMONE: CPT

## 2024-09-03 PROCEDURE — 84439 ASSAY OF FREE THYROXINE: CPT

## 2024-09-04 SDOH — ECONOMIC STABILITY: FOOD INSECURITY: WITHIN THE PAST 12 MONTHS, YOU WORRIED THAT YOUR FOOD WOULD RUN OUT BEFORE YOU GOT MONEY TO BUY MORE.: NEVER TRUE

## 2024-09-04 SDOH — ECONOMIC STABILITY: FOOD INSECURITY: WITHIN THE PAST 12 MONTHS, THE FOOD YOU BOUGHT JUST DIDN'T LAST AND YOU DIDN'T HAVE MONEY TO GET MORE.: NEVER TRUE

## 2024-09-04 SDOH — HEALTH STABILITY: PHYSICAL HEALTH: ON AVERAGE, HOW MANY DAYS PER WEEK DO YOU ENGAGE IN MODERATE TO STRENUOUS EXERCISE (LIKE A BRISK WALK)?: 4 DAYS

## 2024-09-04 SDOH — HEALTH STABILITY: PHYSICAL HEALTH: ON AVERAGE, HOW MANY MINUTES DO YOU ENGAGE IN EXERCISE AT THIS LEVEL?: 60 MIN

## 2024-09-04 SDOH — ECONOMIC STABILITY: INCOME INSECURITY: HOW HARD IS IT FOR YOU TO PAY FOR THE VERY BASICS LIKE FOOD, HOUSING, MEDICAL CARE, AND HEATING?: NOT HARD AT ALL

## 2024-09-04 ASSESSMENT — PATIENT HEALTH QUESTIONNAIRE - PHQ9
SUM OF ALL RESPONSES TO PHQ QUESTIONS 1-9: 0
SUM OF ALL RESPONSES TO PHQ QUESTIONS 1-9: 0
2. FEELING DOWN, DEPRESSED OR HOPELESS: NOT AT ALL
SUM OF ALL RESPONSES TO PHQ QUESTIONS 1-9: 0
SUM OF ALL RESPONSES TO PHQ QUESTIONS 1-9: 0
SUM OF ALL RESPONSES TO PHQ9 QUESTIONS 1 & 2: 0
1. LITTLE INTEREST OR PLEASURE IN DOING THINGS: NOT AT ALL

## 2024-09-04 ASSESSMENT — LIFESTYLE VARIABLES
HOW OFTEN DO YOU HAVE A DRINK CONTAINING ALCOHOL: 2-4 TIMES A MONTH
HOW MANY STANDARD DRINKS CONTAINING ALCOHOL DO YOU HAVE ON A TYPICAL DAY: 1
HOW OFTEN DO YOU HAVE SIX OR MORE DRINKS ON ONE OCCASION: 1
HOW MANY STANDARD DRINKS CONTAINING ALCOHOL DO YOU HAVE ON A TYPICAL DAY: 1 OR 2
HOW OFTEN DO YOU HAVE A DRINK CONTAINING ALCOHOL: 3

## 2024-09-05 ENCOUNTER — OFFICE VISIT (OUTPATIENT)
Dept: FAMILY MEDICINE CLINIC | Age: 69
End: 2024-09-05

## 2024-09-05 VITALS
WEIGHT: 156 LBS | DIASTOLIC BLOOD PRESSURE: 74 MMHG | HEART RATE: 73 BPM | RESPIRATION RATE: 18 BRPM | HEIGHT: 64 IN | OXYGEN SATURATION: 97 % | SYSTOLIC BLOOD PRESSURE: 110 MMHG | BODY MASS INDEX: 26.63 KG/M2

## 2024-09-05 DIAGNOSIS — M20.12 VALGUS DEFORMITY OF BOTH GREAT TOES: ICD-10-CM

## 2024-09-05 DIAGNOSIS — M20.11 VALGUS DEFORMITY OF BOTH GREAT TOES: ICD-10-CM

## 2024-09-05 DIAGNOSIS — Z23 NEED FOR PNEUMOCOCCAL VACCINATION: ICD-10-CM

## 2024-09-05 DIAGNOSIS — Z78.0 POST-MENOPAUSAL: ICD-10-CM

## 2024-09-05 DIAGNOSIS — Z00.00 MEDICARE ANNUAL WELLNESS VISIT, SUBSEQUENT: Primary | ICD-10-CM

## 2024-09-05 DIAGNOSIS — E03.8 OTHER SPECIFIED HYPOTHYROIDISM: ICD-10-CM

## 2024-09-05 NOTE — PROGRESS NOTES
After obtaining consent, and per orders of Dr. Al, injection given by Britt Pennington MA. Patient instructed to report any adverse reaction to me immediately.    Immunizations Administered       Name Date Dose Route    Pneumococcal, PCV20, PREVNAR 20, (age 6w+), IM, 0.5mL 9/5/2024 0.5 mL Intramuscular    Site: Deltoid- Left    Lot: [id number]    NDC: 0005-2000-10            Patient tolerated well  VIS given  Vaccine Checklist filled out

## 2024-09-05 NOTE — PROGRESS NOTES
Medicare Annual Wellness Visit    Diamond Ortiz is here for Medicare AWV    Assessment & Plan   Medicare annual wellness visit, subsequent  Other specified hypothyroidism  Post-menopausal  -     DEXA BONE DENSITY 2 SITES; Future  Valgus deformity of both great toes  Need for pneumococcal vaccination  -     Pneumococcal, PCV20, PREVNAR 20, (age 6w+), IM, PF      Hypothyroidism: Recent normal thyroid labs.  Chronic.  Controlled.  Continue Synthroid    Valgus deformity of both great toes.  Use shoes with wide toe box, may try bunion spacer and topical analgesics.  If not improving, she will let us know and we will place referral to podiatry for possible surgery    Recommendations for Preventive Services Due: see orders and patient instructions/AVS.  Recommended screening schedule for the next 5-10 years is provided to the patient in written form: see Patient Instructions/AVS.     Return in about 1 year (around 9/8/2025) for medicare wellness.     Subjective       Feet:  known bunions.  6 weeks or pain. Toes are deviating laterally. Achy pain with walking. Using wide shoes.    The 10-year ASCVD risk score (Tramaine SPEAR, et al., 2019) is: 6.1%    Values used to calculate the score:      Age: 69 years      Sex: Female      Is Non- : No      Diabetic: No      Tobacco smoker: No      Systolic Blood Pressure: 110 mmHg      Is BP treated: No      HDL Cholesterol: 59 mg/dL      Total Cholesterol: 240 mg/dL      Patient's complete Health Risk Assessment and screening values have been reviewed and are found in Flowsheets. The following problems were reviewed today and where indicated follow up appointments were made and/or referrals ordered.    Positive Risk Factor Screenings with Interventions:                     Vision Screen:  Do you have difficulty driving, watching TV, or doing any of your daily activities because of your eyesight?: No  Have you had an eye exam within the past year?: (!)

## 2024-09-17 ENCOUNTER — HOSPITAL ENCOUNTER (OUTPATIENT)
Dept: WOMENS IMAGING | Age: 69
Discharge: HOME | End: 2024-09-17
Attending: FAMILY MEDICINE
Payer: MEDICARE

## 2024-09-17 DIAGNOSIS — Z78.0 POST-MENOPAUSAL: ICD-10-CM

## 2024-09-17 PROCEDURE — 77080 DXA BONE DENSITY AXIAL: CPT

## 2024-10-11 ENCOUNTER — TELEPHONE (OUTPATIENT)
Dept: FAMILY MEDICINE CLINIC | Age: 69
End: 2024-10-11

## 2024-10-11 ENCOUNTER — HOSPITAL ENCOUNTER (EMERGENCY)
Age: 69
Discharge: HOME | End: 2024-10-11
Payer: MEDICARE

## 2024-10-11 VITALS
SYSTOLIC BLOOD PRESSURE: 114 MMHG | HEIGHT: 64 IN | TEMPERATURE: 98.2 F | DIASTOLIC BLOOD PRESSURE: 70 MMHG | OXYGEN SATURATION: 97 % | WEIGHT: 155 LBS | RESPIRATION RATE: 18 BRPM | BODY MASS INDEX: 26.46 KG/M2 | HEART RATE: 69 BPM

## 2024-10-11 DIAGNOSIS — T14.8XXA PUNCTURE WOUND: Primary | ICD-10-CM

## 2024-10-11 PROCEDURE — 6360000002 HC RX W HCPCS: Performed by: NURSE PRACTITIONER

## 2024-10-11 PROCEDURE — 90471 IMMUNIZATION ADMIN: CPT | Performed by: NURSE PRACTITIONER

## 2024-10-11 PROCEDURE — 90715 TDAP VACCINE 7 YRS/> IM: CPT | Performed by: NURSE PRACTITIONER

## 2024-10-11 PROCEDURE — 99213 OFFICE O/P EST LOW 20 MIN: CPT

## 2024-10-11 PROCEDURE — 99212 OFFICE O/P EST SF 10 MIN: CPT | Performed by: NURSE PRACTITIONER

## 2024-10-11 RX ADMIN — TETANUS TOXOID, REDUCED DIPHTHERIA TOXOID AND ACELLULAR PERTUSSIS VACCINE, ADSORBED 0.5 ML: 5; 2.5; 8; 8; 2.5 SUSPENSION INTRAMUSCULAR at 17:06

## 2024-10-11 ASSESSMENT — PAIN - FUNCTIONAL ASSESSMENT: PAIN_FUNCTIONAL_ASSESSMENT: NONE - DENIES PAIN

## 2024-10-11 NOTE — TELEPHONE ENCOUNTER
Patient states her finger was punctured by alice barbed wire  Asking if she can come in for a tetanus shot    Advised patient she would need an office visit for evaluation of puncture wound   No openings today    She will go to urgent care

## 2024-10-11 NOTE — ED NOTES
Small 1-2mm scabbed puncture wound noted to anterior right forearm without redness or drainage       Jean Marie Villanueva, RN  10/11/24 [id number]

## 2024-10-11 NOTE — ED NOTES
Pt denies complaints.  Given discharge instructions and verbalizes understanding.      Jean Marie Villanueva RN  10/11/24 [id number]

## 2024-10-11 NOTE — ED PROVIDER NOTES
[facility]  UrgentCare Encounter      CHIEFCOMPLAINT       Chief Complaint   Patient presents with    Puncture Wound     Clearing items out of a barn and poked right anterior forearm with a piece of alice barbed wire.  Superficial scabbed puncture wound        Nurses Notes reviewed and I agree except as noted in the HPI.  HISTORY OF PRESENT ILLNESS   Diamond Ortiz is a 69 y.o. female who presents to urgent care with plaints of a puncture wound to the right forearm.  Injury happened at approximately 11 AM today today.  She states that were cleaning out an old barn and she is concerned about her tetanus not being up-to-date.    REVIEW OF SYSTEMS     Review of Systems   Skin:  Positive for wound.       PAST MEDICAL HISTORY         Diagnosis Date    Thyroid disease     hypothyroidism       SURGICAL HISTORY     Patient  has a past surgical history that includes Cholecystectomy (2005?); Dilation and curettage of uterus; Colonoscopy (07/18/2017); pr colon ca scrn not hi rsk ind (Left, 7/18/2017); and Colonoscopy (N/A, 9/28/2022).    CURRENT MEDICATIONS       Discharge Medication List as of 10/11/2024  5:10 PM        CONTINUE these medications which have NOT CHANGED    Details   levothyroxine (SYNTHROID) 100 MCG tablet TAKE 1 TABLET DAILY, Disp-5 tablet, R-0Normal      calcium carbonate (OSCAL) 500 MG TABS tablet Take 1 tablet by mouth dailyHistorical Med      Turmeric 400 MG CAPS Take by mouthHistorical Med      Ascorbic Acid (VITAMIN C) 500 MG tablet Take 1 tablet by mouth dailyHistorical Med      Cyanocobalamin (VITAMIN B 12 PO) Take by mouth      Omega-3 Fatty Acids (FISH OIL) 1000 MG CAPS Take 3 capsules by mouth 3 times dailyHistorical Med             ALLERGIES     Patient is has No Known Allergies.    FAMILY HISTORY     Patient'sfamily history includes Cancer in her sister; Diabetes in her father; Heart Disease in her father, maternal grandfather, and sister; Stroke in her paternal

## 2024-12-04 ENCOUNTER — PATIENT MESSAGE (OUTPATIENT)
Dept: FAMILY MEDICINE CLINIC | Age: 69
End: 2024-12-04

## 2024-12-04 DIAGNOSIS — E03.8 OTHER SPECIFIED HYPOTHYROIDISM: ICD-10-CM

## 2024-12-04 RX ORDER — LEVOTHYROXINE SODIUM 100 UG/1
TABLET ORAL
Qty: 90 TABLET | Refills: 3 | Status: SHIPPED | OUTPATIENT
Start: 2024-12-04

## 2024-12-04 NOTE — TELEPHONE ENCOUNTER
Diamond Ortiz called requesting a refill on the following medications:  Requested Prescriptions     Pending Prescriptions Disp Refills    levothyroxine (SYNTHROID) 100 MCG tablet 5 tablet 0     Sig: TAKE 1 TABLET DAILY       Date of last visit: 9/5/2024  Date of next visit (if applicable):Visit date not found  Date of last refill: 3/21/24  Pharmacy Name: [facility],  Radha Orr MA

## (undated) DEVICE — GLOVE ORTHO 8   MSG9480